# Patient Record
Sex: MALE | Race: WHITE | Employment: FULL TIME | URBAN - METROPOLITAN AREA
[De-identification: names, ages, dates, MRNs, and addresses within clinical notes are randomized per-mention and may not be internally consistent; named-entity substitution may affect disease eponyms.]

---

## 2017-07-24 ENCOUNTER — OFFICE VISIT (OUTPATIENT)
Dept: FAMILY MEDICINE CLINIC | Age: 32
End: 2017-07-24

## 2017-07-24 VITALS
DIASTOLIC BLOOD PRESSURE: 84 MMHG | SYSTOLIC BLOOD PRESSURE: 142 MMHG | HEIGHT: 75 IN | WEIGHT: 272 LBS | BODY MASS INDEX: 33.82 KG/M2

## 2017-07-24 DIAGNOSIS — J45.20 MILD INTERMITTENT ASTHMA WITHOUT COMPLICATION: ICD-10-CM

## 2017-07-24 DIAGNOSIS — E66.09 NON MORBID OBESITY DUE TO EXCESS CALORIES: ICD-10-CM

## 2017-07-24 DIAGNOSIS — R03.0 ELEVATED BLOOD PRESSURE READING WITHOUT DIAGNOSIS OF HYPERTENSION: ICD-10-CM

## 2017-07-24 DIAGNOSIS — E78.00 HYPERCHOLESTEROLEMIA: Primary | ICD-10-CM

## 2017-07-24 PROCEDURE — 99214 OFFICE O/P EST MOD 30 MIN: CPT | Performed by: FAMILY MEDICINE

## 2017-07-24 RX ORDER — ATORVASTATIN CALCIUM 40 MG/1
40 TABLET, FILM COATED ORAL DAILY
Qty: 30 TABLET | Refills: 11 | Status: SHIPPED | OUTPATIENT
Start: 2017-07-24 | End: 2018-10-16

## 2017-12-28 ENCOUNTER — OFFICE VISIT (OUTPATIENT)
Dept: FAMILY MEDICINE CLINIC | Age: 32
End: 2017-12-28

## 2017-12-28 VITALS
BODY MASS INDEX: 34.57 KG/M2 | DIASTOLIC BLOOD PRESSURE: 66 MMHG | SYSTOLIC BLOOD PRESSURE: 102 MMHG | HEART RATE: 145 BPM | RESPIRATION RATE: 18 BRPM | OXYGEN SATURATION: 92 % | HEIGHT: 75 IN | WEIGHT: 278 LBS | TEMPERATURE: 100.2 F

## 2017-12-28 DIAGNOSIS — J45.20 MILD INTERMITTENT ASTHMA WITHOUT COMPLICATION: ICD-10-CM

## 2017-12-28 DIAGNOSIS — E78.00 HYPERCHOLESTEROLEMIA: ICD-10-CM

## 2017-12-28 DIAGNOSIS — R00.0 TACHYCARDIA: Primary | ICD-10-CM

## 2017-12-28 DIAGNOSIS — R68.89 FLU-LIKE SYMPTOMS: ICD-10-CM

## 2017-12-28 PROCEDURE — G8484 FLU IMMUNIZE NO ADMIN: HCPCS | Performed by: INTERNAL MEDICINE

## 2017-12-28 PROCEDURE — G8427 DOCREV CUR MEDS BY ELIG CLIN: HCPCS | Performed by: INTERNAL MEDICINE

## 2017-12-28 PROCEDURE — G8417 CALC BMI ABV UP PARAM F/U: HCPCS | Performed by: INTERNAL MEDICINE

## 2017-12-28 PROCEDURE — 99214 OFFICE O/P EST MOD 30 MIN: CPT | Performed by: INTERNAL MEDICINE

## 2017-12-28 PROCEDURE — 1036F TOBACCO NON-USER: CPT | Performed by: INTERNAL MEDICINE

## 2017-12-28 PROCEDURE — 93000 ELECTROCARDIOGRAM COMPLETE: CPT | Performed by: INTERNAL MEDICINE

## 2017-12-28 NOTE — PROGRESS NOTES
Bleeding childhood         Social History     Social History    Marital status: Single     Spouse name: Laisha Mac    Number of children: 2    Years of education: N/A     Occupational History    [employer]        Social History Main Topics    Smoking status: Never Smoker    Smokeless tobacco: Not on file    Alcohol use 0.0 oz/week      Comment: Rare    Drug use: Unknown    Sexual activity: Not on file     Other Topics Concern    Not on file     Social History Narrative    No narrative on file       Family History   Problem Relation Age of Onset    Heart Attack Mother 39     x 3    Hypertension Mother     High Cholesterol Mother     High Cholesterol Father     Hypertension Father     Heart Attack Father 43           Review of Systems      Objective     /66   Pulse 145   Temp 100.2 °F (37.9 °C)   Resp 18   Ht 6' 3\" (1.905 m)   Wt 278 lb (126.1 kg)   SpO2 92% Comment: RA  BMI 34.75 kg/m²         Wt Readings from Last 3 Encounters:   12/28/17 278 lb (126.1 kg)   07/24/17 272 lb (123.4 kg)   09/01/16 273 lb (123.8 kg)       Physical Exam     NAD alert and cooperative Moderately ill  HEENT: Conjunctival injection. TMs unremarkable. Dry hypopharynx. No icterus. Good upstrokes of the carotids. No adenopathy. Lungs decreased breath sounds. Increased cough with deep inspiration. Questionable rales left base. Cardiovascular exam tachycardic. No murmur or ectopy noted. No hepatosplenomegaly or epigastric tenderness  No cords. Good skin turgor.     EKG sinus tachycardia 140 with abnormal T-wave diffusely      Chemistry        Component Value Date/Time     09/01/2016 0903    K 4.8 09/01/2016 0903    CL 98 (L) 09/01/2016 0903    CO2 29 09/01/2016 0903    BUN 15 09/01/2016 0903    CREATININE 1.0 09/01/2016 0903        Component Value Date/Time    CALCIUM 9.9 09/01/2016 0903    ALKPHOS 52 09/01/2016 0903    AST 31 09/01/2016 0903    ALT 42 (H) 09/01/2016 0903    BILITOT 0.4 09/01/2016 0903            No results found for: WBC, HGB, HCT, MCV, PLT  No results found for: LABA1C  No results found for: EAG  No results found for: LABA1C  No components found for: CHLPL  Lab Results   Component Value Date    TRIG 630 (H) 09/01/2016    TRIG 472 (H) 06/02/2016    TRIG 256 (H) 05/05/2015     Lab Results   Component Value Date    HDL 50 09/01/2016    HDL 53 06/02/2016    HDL 39 (L) 05/05/2015     Lab Results   Component Value Date    LDLCALC see below 09/01/2016    LDLCALC see below 06/02/2016    LDLCALC 160 (H) 05/05/2015     Lab Results   Component Value Date    LABVLDL see below 09/01/2016    LABVLDL see below 06/02/2016    LABVLDL 51 05/05/2015       Old labs and records reviewed or requested  Discussed past lab and studies with patient   1. Tachycardia  EKG 12 Lead   2. Flu-like symptoms     3. Hypercholesterolemia     4. Mild intermittent asthma without complication         Probable influenza with dehydration and tachycardia. History of early MI and parents in her 45s and his change is on EKG with hyperlipidemia make this more concerning. Was given aspirin and in the clinic and EMS called for transport. Anticipate chest x-ray nebulizer rule out myocardial issues and IV fluids      Follow-up next week      Diagnosis and treatment discussed.   Possible side effects of medication reviewed  Patients questions answered  Follow up understood  Pt aware if they are not contacted about any test results , this does not mean they are normal.  They should call

## 2018-10-11 LAB
BUN BLDV-MCNC: 16 MG/DL
CALCIUM SERPL-MCNC: 4.9 MG/DL
CHLORIDE BLD-SCNC: 104 MMOL/L
CO2: 26 MMOL/L
CREAT SERPL-MCNC: 0.9 MG/DL
GFR CALCULATED: 85
GLUCOSE BLD-MCNC: 100 MG/DL
POTASSIUM SERPL-SCNC: 3.9 MMOL/L
SODIUM BLD-SCNC: 140 MMOL/L

## 2018-10-16 PROBLEM — E78.2 MIXED HYPERLIPIDEMIA: Status: ACTIVE | Noted: 2018-10-11

## 2018-10-16 PROBLEM — I25.10 CAD, MULTIPLE VESSEL: Status: ACTIVE | Noted: 2018-10-16

## 2018-10-16 PROBLEM — I21.4 NSTEMI (NON-ST ELEVATED MYOCARDIAL INFARCTION) (HCC): Status: ACTIVE | Noted: 2018-10-16

## 2018-10-16 PROBLEM — I10 ESSENTIAL HYPERTENSION: Status: ACTIVE | Noted: 2018-10-16

## 2018-10-16 RX ORDER — CARVEDILOL 6.25 MG/1
6.25 TABLET ORAL
COMMUNITY
Start: 2018-10-12 | End: 2020-09-24 | Stop reason: SDUPTHER

## 2018-10-16 RX ORDER — CLOPIDOGREL BISULFATE 75 MG/1
75 TABLET ORAL
COMMUNITY
Start: 2018-10-13 | End: 2019-11-11 | Stop reason: ALTCHOICE

## 2018-10-16 RX ORDER — ATORVASTATIN CALCIUM 80 MG/1
80 TABLET, FILM COATED ORAL
COMMUNITY
Start: 2018-10-12 | End: 2021-03-31 | Stop reason: SDUPTHER

## 2018-10-16 RX ORDER — LOSARTAN POTASSIUM 25 MG/1
25 TABLET ORAL
COMMUNITY
Start: 2018-10-12 | End: 2021-03-31 | Stop reason: SDUPTHER

## 2018-10-16 RX ORDER — ASPIRIN 325 MG
325 TABLET ORAL
COMMUNITY
Start: 2018-10-13 | End: 2019-02-28 | Stop reason: CLARIF

## 2018-10-17 ENCOUNTER — OFFICE VISIT (OUTPATIENT)
Dept: FAMILY MEDICINE CLINIC | Age: 33
End: 2018-10-17
Payer: COMMERCIAL

## 2018-10-17 VITALS
TEMPERATURE: 95 F | BODY MASS INDEX: 34.07 KG/M2 | HEART RATE: 97 BPM | HEIGHT: 75 IN | SYSTOLIC BLOOD PRESSURE: 123 MMHG | DIASTOLIC BLOOD PRESSURE: 83 MMHG | WEIGHT: 274 LBS

## 2018-10-17 DIAGNOSIS — I21.4 NSTEMI (NON-ST ELEVATED MYOCARDIAL INFARCTION) (HCC): Primary | ICD-10-CM

## 2018-10-17 DIAGNOSIS — E66.09 NON MORBID OBESITY DUE TO EXCESS CALORIES: ICD-10-CM

## 2018-10-17 DIAGNOSIS — E78.2 MIXED HYPERLIPIDEMIA: ICD-10-CM

## 2018-10-17 DIAGNOSIS — I10 ESSENTIAL HYPERTENSION: ICD-10-CM

## 2018-10-17 DIAGNOSIS — I25.10 CAD, MULTIPLE VESSEL: ICD-10-CM

## 2018-10-17 DIAGNOSIS — J30.1 SEASONAL ALLERGIC RHINITIS DUE TO POLLEN: ICD-10-CM

## 2018-10-17 PROCEDURE — G8484 FLU IMMUNIZE NO ADMIN: HCPCS | Performed by: FAMILY MEDICINE

## 2018-10-17 PROCEDURE — 1111F DSCHRG MED/CURRENT MED MERGE: CPT | Performed by: FAMILY MEDICINE

## 2018-10-17 PROCEDURE — 99214 OFFICE O/P EST MOD 30 MIN: CPT | Performed by: FAMILY MEDICINE

## 2018-10-17 PROCEDURE — G8427 DOCREV CUR MEDS BY ELIG CLIN: HCPCS | Performed by: FAMILY MEDICINE

## 2018-10-17 PROCEDURE — G8598 ASA/ANTIPLAT THER USED: HCPCS | Performed by: FAMILY MEDICINE

## 2018-10-17 PROCEDURE — G8417 CALC BMI ABV UP PARAM F/U: HCPCS | Performed by: FAMILY MEDICINE

## 2018-10-17 PROCEDURE — 1036F TOBACCO NON-USER: CPT | Performed by: FAMILY MEDICINE

## 2018-10-17 RX ORDER — FLUTICASONE PROPIONATE 50 MCG
1 SPRAY, SUSPENSION (ML) NASAL DAILY
Qty: 1 BOTTLE | Refills: 3 | Status: SHIPPED | OUTPATIENT
Start: 2018-10-17 | End: 2020-07-28

## 2018-10-17 RX ORDER — AZELASTINE 1 MG/ML
2 SPRAY, METERED NASAL 2 TIMES DAILY
Qty: 1 BOTTLE | Refills: 3 | Status: SHIPPED | OUTPATIENT
Start: 2018-10-17 | End: 2020-07-28

## 2018-10-17 RX ORDER — LISINOPRIL 5 MG/1
5 TABLET ORAL DAILY
COMMUNITY
End: 2018-10-17

## 2018-10-17 ASSESSMENT — PATIENT HEALTH QUESTIONNAIRE - PHQ9
SUM OF ALL RESPONSES TO PHQ9 QUESTIONS 1 & 2: 0
SUM OF ALL RESPONSES TO PHQ QUESTIONS 1-9: 0
SUM OF ALL RESPONSES TO PHQ QUESTIONS 1-9: 0
2. FEELING DOWN, DEPRESSED OR HOPELESS: 0
1. LITTLE INTEREST OR PLEASURE IN DOING THINGS: 0

## 2018-10-17 ASSESSMENT — ENCOUNTER SYMPTOMS
RHINORRHEA: 0
COUGH: 1
SHORTNESS OF BREATH: 0
DIARRHEA: 0
CONSTIPATION: 0
NAUSEA: 0
SORE THROAT: 0
EYE PAIN: 0
COLOR CHANGE: 0
VOMITING: 0
ABDOMINAL PAIN: 0

## 2018-10-17 NOTE — PATIENT INSTRUCTIONS
Wt Readings from Last 3 Encounters:   10/17/18 274 lb (124.3 kg)   12/28/17 278 lb (126.1 kg)   12/28/17 278 lb (126.1 kg)       BP Readings from Last 3 Encounters:   10/17/18 123/83   12/28/17 110/88   12/28/17 102/66       Here are some recommendations for weight loss:  Check into The GI Diet or \"Primal diet\"    Take your desired weight in pounds and multiply by 10 and that is your average daily calorie allowance. For example if you wish to weigh 170 lb x 10 = 1700 era/day (this is how to gradually lose the weight and maintain your desired weight). Avoid soda/coke and all \"wet carbs\" => Drink ice water instead    Drink a large glass of ice water before meals and EAT SLOWLY (talk while you eat)! Rethink your hunger => it means your losing weight. Minimize carbohydrates as they stimulate your appetite.   Avoid Bread, Rice, Pasta and Potatoes      Avoid eating calories after 6 pm    Consider getting [employer] alexandre for your phone  Consider getting a FitBit to track your activity

## 2018-10-17 NOTE — PROGRESS NOTES
Chief Complaint   Patient presents with    Follow-Up from Hospital           HPI:  Bj Bhat is a 35 y.o. (: 1985) here today   for a follow up from his recent hospitalization at [facility] on 10/11/18 and discharged 10/13/18 from a myocardial infarction. He had noted exertional chest pain (with walking) the morning he went into the ER. He was taken to the cath lab and had 4 stents placed and has been discharged on statins, hypertension meds, and plavix. He says that he will be following Dr. Dorita Walker who practices at [facility]. He has an appt with CNP at his office on the  of this month to start his after care. He says has instructions to follow up with cardiac rehab. Review of Systems   Constitutional: Negative for chills and fever. HENT: Positive for congestion. Negative for ear pain, rhinorrhea and sore throat. Eyes: Negative for pain and visual disturbance. Respiratory: Positive for cough. Negative for shortness of breath. Cardiovascular: Negative for chest pain and palpitations. Gastrointestinal: Negative for abdominal pain, constipation, diarrhea, nausea and vomiting. Genitourinary: Negative for dysuria and frequency. Musculoskeletal: Negative for joint swelling and myalgias. Skin: Negative for color change and rash. Neurological: Negative for weakness, numbness and headaches. Hematological: Negative for adenopathy. Does not bruise/bleed easily. Psychiatric/Behavioral: Negative for dysphoric mood, self-injury and suicidal ideas. The patient is not nervous/anxious. Allergies   Allergen Reactions    Zocor [Simvastatin]      New Prescriptions    No medications on file       Meds Prior to visit:  No current outpatient prescriptions on file prior to visit. No current facility-administered medications on file prior to visit.         Past Medical History:   Diagnosis Date    Allergic rhinitis     Asthma     Hypercholesterolemia     Urticaria      Past Surgical route 2 times daily Use in each nostril as directed  Dispense: 1 Bottle; Refill: 3      Marii Parra received counseling on the following healthy behaviors: nutrition and exercise    Patient given educational materials on Nutrition    Discussed use, benefit, and side effects of prescribed medications. Barriers to medication compliance addressed. All patient questions answered. Pt voiced understanding. RTC 6 weeks. Scribe attestation:  Sera Alicea MA, am scribing for and in the presence of Chaz Valadez MD. Electronically signed by Peggy Sheffield MA on 10/17/2018 at 3:16 PM          Provider attestation: Hector Mckeon MD, personally performed the services scribed by the user listed above in my presence, and it is both accurate and complete. I agree with the ROS and Past Histories independently gathered by the clinical support staff and the remaining scribed note accurately describes my personal service to the patient.     [facility]    10/17/2018  3:34 PM

## 2018-10-18 LAB — TROPONIN: 0.09

## 2018-11-28 ENCOUNTER — OFFICE VISIT (OUTPATIENT)
Dept: FAMILY MEDICINE CLINIC | Age: 33
End: 2018-11-28
Payer: COMMERCIAL

## 2018-11-28 VITALS
BODY MASS INDEX: 32.83 KG/M2 | WEIGHT: 264 LBS | HEIGHT: 75 IN | SYSTOLIC BLOOD PRESSURE: 112 MMHG | DIASTOLIC BLOOD PRESSURE: 80 MMHG

## 2018-11-28 DIAGNOSIS — I10 ESSENTIAL HYPERTENSION: ICD-10-CM

## 2018-11-28 DIAGNOSIS — I25.10 CAD, MULTIPLE VESSEL: Primary | ICD-10-CM

## 2018-11-28 DIAGNOSIS — E78.00 HYPERCHOLESTEROLEMIA: ICD-10-CM

## 2018-11-28 DIAGNOSIS — E66.09 NON MORBID OBESITY DUE TO EXCESS CALORIES: ICD-10-CM

## 2018-11-28 DIAGNOSIS — Z23 NEED FOR VACCINATION: ICD-10-CM

## 2018-11-28 PROCEDURE — G8484 FLU IMMUNIZE NO ADMIN: HCPCS | Performed by: FAMILY MEDICINE

## 2018-11-28 PROCEDURE — G8427 DOCREV CUR MEDS BY ELIG CLIN: HCPCS | Performed by: FAMILY MEDICINE

## 2018-11-28 PROCEDURE — G8598 ASA/ANTIPLAT THER USED: HCPCS | Performed by: FAMILY MEDICINE

## 2018-11-28 PROCEDURE — G8417 CALC BMI ABV UP PARAM F/U: HCPCS | Performed by: FAMILY MEDICINE

## 2018-11-28 PROCEDURE — 99214 OFFICE O/P EST MOD 30 MIN: CPT | Performed by: FAMILY MEDICINE

## 2018-11-28 PROCEDURE — 1036F TOBACCO NON-USER: CPT | Performed by: FAMILY MEDICINE

## 2018-11-28 RX ORDER — ICOSAPENT ETHYL 1000 MG/1
2 CAPSULE ORAL 2 TIMES DAILY
Qty: 120 CAPSULE | Refills: 5 | Status: SHIPPED | OUTPATIENT
Start: 2018-11-28 | End: 2019-02-28

## 2018-11-28 RX ORDER — EZETIMIBE 10 MG/1
10 TABLET ORAL
COMMUNITY
Start: 2018-11-20 | End: 2021-03-31 | Stop reason: SDUPTHER

## 2018-11-28 ASSESSMENT — ENCOUNTER SYMPTOMS
CONSTIPATION: 0
SHORTNESS OF BREATH: 0
VOMITING: 0
ABDOMINAL PAIN: 0
COUGH: 0
NAUSEA: 0
DIARRHEA: 0

## 2018-11-30 ENCOUNTER — TELEPHONE (OUTPATIENT)
Dept: FAMILY MEDICINE CLINIC | Age: 33
End: 2018-11-30

## 2019-02-14 ENCOUNTER — TELEPHONE (OUTPATIENT)
Dept: PRIMARY CARE CLINIC | Age: 34
End: 2019-02-14

## 2019-02-28 ENCOUNTER — OFFICE VISIT (OUTPATIENT)
Dept: PRIMARY CARE CLINIC | Age: 34
End: 2019-02-28
Payer: COMMERCIAL

## 2019-02-28 VITALS
SYSTOLIC BLOOD PRESSURE: 123 MMHG | WEIGHT: 264 LBS | HEART RATE: 88 BPM | BODY MASS INDEX: 32.83 KG/M2 | DIASTOLIC BLOOD PRESSURE: 77 MMHG | HEIGHT: 75 IN

## 2019-02-28 DIAGNOSIS — E66.09 NON MORBID OBESITY DUE TO EXCESS CALORIES: ICD-10-CM

## 2019-02-28 DIAGNOSIS — J45.20 MILD INTERMITTENT ASTHMA WITHOUT COMPLICATION: ICD-10-CM

## 2019-02-28 DIAGNOSIS — I10 ESSENTIAL HYPERTENSION: ICD-10-CM

## 2019-02-28 DIAGNOSIS — E78.2 MIXED HYPERLIPIDEMIA: ICD-10-CM

## 2019-02-28 DIAGNOSIS — I25.10 CAD, MULTIPLE VESSEL: Primary | ICD-10-CM

## 2019-02-28 DIAGNOSIS — E78.00 HYPERCHOLESTEROLEMIA: ICD-10-CM

## 2019-02-28 PROCEDURE — G8598 ASA/ANTIPLAT THER USED: HCPCS | Performed by: FAMILY MEDICINE

## 2019-02-28 PROCEDURE — G8417 CALC BMI ABV UP PARAM F/U: HCPCS | Performed by: FAMILY MEDICINE

## 2019-02-28 PROCEDURE — 99215 OFFICE O/P EST HI 40 MIN: CPT | Performed by: FAMILY MEDICINE

## 2019-02-28 PROCEDURE — G8427 DOCREV CUR MEDS BY ELIG CLIN: HCPCS | Performed by: FAMILY MEDICINE

## 2019-02-28 PROCEDURE — 1036F TOBACCO NON-USER: CPT | Performed by: FAMILY MEDICINE

## 2019-02-28 PROCEDURE — G8484 FLU IMMUNIZE NO ADMIN: HCPCS | Performed by: FAMILY MEDICINE

## 2019-02-28 RX ORDER — ICOSAPENT ETHYL 1000 MG/1
2 CAPSULE ORAL 2 TIMES DAILY
Qty: 120 CAPSULE | Refills: 5 | Status: SHIPPED | OUTPATIENT
Start: 2019-02-28 | End: 2020-07-21 | Stop reason: SDUPTHER

## 2019-02-28 RX ORDER — ASPIRIN 81 MG/1
TABLET, COATED ORAL
Refills: 10 | COMMUNITY
Start: 2019-01-02 | End: 2022-03-03 | Stop reason: SDUPTHER

## 2019-02-28 ASSESSMENT — ENCOUNTER SYMPTOMS
NAUSEA: 0
RHINORRHEA: 0
VOMITING: 0
EYE PAIN: 0
CONSTIPATION: 0
ABDOMINAL PAIN: 0
COLOR CHANGE: 0
COUGH: 0
SHORTNESS OF BREATH: 0
DIARRHEA: 0
SORE THROAT: 0

## 2019-02-28 ASSESSMENT — PATIENT HEALTH QUESTIONNAIRE - PHQ9
SUM OF ALL RESPONSES TO PHQ QUESTIONS 1-9: 0
1. LITTLE INTEREST OR PLEASURE IN DOING THINGS: 0
SUM OF ALL RESPONSES TO PHQ9 QUESTIONS 1 & 2: 0
2. FEELING DOWN, DEPRESSED OR HOPELESS: 0
SUM OF ALL RESPONSES TO PHQ QUESTIONS 1-9: 0

## 2019-05-16 ENCOUNTER — TELEPHONE (OUTPATIENT)
Dept: PRIMARY CARE CLINIC | Age: 34
End: 2019-05-16

## 2019-05-16 NOTE — TELEPHONE ENCOUNTER
Attempted to contact patient on 5/16/2019. Result: left message on the patient's voicemail asking patient to return my call. Pre-Visit planning not completed.            Humberto Kang  Patient Services Specialist  [phone]

## 2019-05-22 NOTE — TELEPHONE ENCOUNTER
Attempted to contact patient on 5/22/2019. Result: left message on the patient's voicemail asking patient to return my call. Pre-Visit planning not completed.            Parish Crawford  Patient Services Specialist  [phone]

## 2019-05-30 ENCOUNTER — OFFICE VISIT (OUTPATIENT)
Dept: PRIMARY CARE CLINIC | Age: 34
End: 2019-05-30
Payer: COMMERCIAL

## 2019-05-30 VITALS
HEART RATE: 89 BPM | DIASTOLIC BLOOD PRESSURE: 84 MMHG | RESPIRATION RATE: 16 BRPM | OXYGEN SATURATION: 96 % | TEMPERATURE: 97.9 F | WEIGHT: 273 LBS | SYSTOLIC BLOOD PRESSURE: 135 MMHG | BODY MASS INDEX: 33.94 KG/M2 | HEIGHT: 75 IN

## 2019-05-30 DIAGNOSIS — I25.10 CAD, MULTIPLE VESSEL: ICD-10-CM

## 2019-05-30 DIAGNOSIS — B35.3 TINEA PEDIS OF LEFT FOOT: ICD-10-CM

## 2019-05-30 DIAGNOSIS — E78.2 MIXED HYPERLIPIDEMIA: Primary | ICD-10-CM

## 2019-05-30 DIAGNOSIS — J45.20 MILD INTERMITTENT ASTHMA WITHOUT COMPLICATION: ICD-10-CM

## 2019-05-30 DIAGNOSIS — I10 ESSENTIAL HYPERTENSION: ICD-10-CM

## 2019-05-30 DIAGNOSIS — E66.09 NON MORBID OBESITY DUE TO EXCESS CALORIES: ICD-10-CM

## 2019-05-30 DIAGNOSIS — E78.2 MIXED HYPERLIPIDEMIA: ICD-10-CM

## 2019-05-30 PROCEDURE — 99215 OFFICE O/P EST HI 40 MIN: CPT | Performed by: FAMILY MEDICINE

## 2019-05-30 PROCEDURE — G8427 DOCREV CUR MEDS BY ELIG CLIN: HCPCS | Performed by: FAMILY MEDICINE

## 2019-05-30 PROCEDURE — 1036F TOBACCO NON-USER: CPT | Performed by: FAMILY MEDICINE

## 2019-05-30 PROCEDURE — G8598 ASA/ANTIPLAT THER USED: HCPCS | Performed by: FAMILY MEDICINE

## 2019-05-30 PROCEDURE — G8417 CALC BMI ABV UP PARAM F/U: HCPCS | Performed by: FAMILY MEDICINE

## 2019-05-30 RX ORDER — PRENATAL VIT 91/IRON/FOLIC/DHA 28-975-200
COMBINATION PACKAGE (EA) ORAL
Qty: 30 G | Refills: 1 | Status: SHIPPED | OUTPATIENT
Start: 2019-05-30 | End: 2019-08-02 | Stop reason: ALTCHOICE

## 2019-05-30 ASSESSMENT — ENCOUNTER SYMPTOMS
VOMITING: 0
COLOR CHANGE: 0
CONSTIPATION: 0
DIARRHEA: 0
EYE PAIN: 0
NAUSEA: 0
ABDOMINAL PAIN: 0
SORE THROAT: 0
SHORTNESS OF BREATH: 0
COUGH: 0
RHINORRHEA: 0

## 2019-05-30 NOTE — PROGRESS NOTES
Chief Complaint   Patient presents with    Hypertension    Coronary Artery Disease    Asthma    Hyperlipidemia    Obesity     Non morbid obesity due to excess calories           HPI:  Maribel Pappas is a 29 y.o. (: 1985) here today for multiple medical problems. He is compliant with his blood pressure medications  without Lightheadedness, Urinary Frequency, Edema and Sedation  He is not checking Home Blood Pressures          He is compliant with his coronary artery disease medications  without Lightheadedness, Urinary Frequency, Edema and Sedation  He does not have dyspnea when: With Heavy Manual labor or Climbing hills  He does not have chest pain when: With Heavy Manual labor or Climbing hills      He is not  taking medications as instructed, no medication side effects noted, no significant ongoing wheezing or shortness of breath, using bronchodilator MDI less than twice a week. He is not having acute dyspnea and chronic dyspnea   He says that he has not used any inhalers as they are to expensive. He is compliant with his cholesterol medication without myalgia and abdominal pain  He is not taking the Vascepa though, he says that it was almost $280.00. Patient cites health, increased physical ability as reasons for wanting to lose weight. Thinks they have trouble with weight due to: food choices, calorie intake, eating after 6pm, and lack of physical activity. He took on additional responsibilityes at work. Weight loss techniques attempted: self-directed dieting and very low calorie diet  Comorbidities: coronary heart disease, dyslipidemias and hypertension      He says that he has some cracked dry skin that has been on going between both his feet but right now it is his left foot. He says that the skin cracked open. Review of Systems   Constitutional: Negative for chills and fever. HENT: Negative for ear pain, rhinorrhea and sore throat.     Eyes: Negative for pain and current facility-administered medications on file prior to visit. Past Medical History:   Diagnosis Date    Allergic rhinitis     Asthma     CAD (coronary artery disease) 10/11/2018    DEDE x 4 (DAPT x 1 year) Dr. Penelope Arthur    Hypercholesterolemia     Urticaria      Past Surgical History:   Procedure Laterality Date    CARDIAC CATHETERIZATION  10/11/2018    4 Stents placed- Dr Marshall Rios      Bleeding childhood     Family History   Problem Relation Age of Onset    Heart Attack Mother 39        x 3    Hypertension Mother     High Cholesterol Mother     High Cholesterol Father     Hypertension Father     Heart Attack Father 43    Heart Attack Maternal Grandmother         Heart attack     Social History     Tobacco Use    Smoking status: Never Smoker    Smokeless tobacco: Never Used   Substance Use Topics    Alcohol use: Yes     Alcohol/week: 0.0 oz     Comment: Rare    Drug use: No       Objective   /84 (Site: Left Upper Arm, Position: Sitting, Cuff Size: Large Adult)   Pulse 89   Temp 97.9 °F (36.6 °C) (Oral)   Resp 16   Ht 6' 3\" (1.905 m)   Wt 273 lb (123.8 kg)   SpO2 96%   BMI 34.12 kg/m²   Wt Readings from Last 3 Encounters:   05/30/19 273 lb (123.8 kg)   02/28/19 264 lb (119.7 kg)   11/28/18 264 lb (119.7 kg)       Physical Exam   Constitutional: He appears well-developed and well-nourished. HENT:   Head: Normocephalic and atraumatic. Nose: Nose normal.   Mouth/Throat: No oropharyngeal exudate. TMs negative bilaterally, canals patent, nasal mucosa pink and patent,  Oropharynx pink and patent   Eyes: Pupils are equal, round, and reactive to light. Right eye exhibits no discharge. Left eye exhibits no discharge. No scleral icterus. Neck: Normal range of motion. Neck supple. No thyromegaly present. Cardiovascular: Normal rate, regular rhythm, normal heart sounds and intact distal pulses. Exam reveals no gallop and no friction rub. No murmur heard.   Pulses: Dorsalis pedis pulses are 2+ on the right side, and 2+ on the left side. Posterior tibial pulses are 2+ on the right side, and 2+ on the left side. No Edema Lower Extremities   Pulmonary/Chest: Effort normal and breath sounds normal. He has no wheezes. He has no rales. Abdominal: Soft. Bowel sounds are normal. He exhibits no distension. There is no splenomegaly or hepatomegaly. There is no tenderness. There is no rebound and no guarding. Musculoskeletal: Normal range of motion. He exhibits no tenderness or deformity. Lymphadenopathy:     He has no cervical adenopathy. Neurological: He is alert. He has normal strength. No cranial nerve deficit or sensory deficit. Gait normal.   Skin: Skin is warm and dry. Rash noted. No erythema. Fissured and flaky rash on L foot sole. Psychiatric: He has a normal mood and affect.  His speech is normal and behavior is normal.         Chemistry        Component Value Date/Time     10/11/2018    K 3.9 10/11/2018     10/11/2018    CO2 26 10/11/2018    BUN 16 10/11/2018    CREATININE 0.9 10/11/2018        Component Value Date/Time    CALCIUM 4.9 10/11/2018    ALKPHOS 52 09/01/2016 0903    AST 31 09/01/2016 0903    ALT 42 (H) 09/01/2016 0903    BILITOT 0.4 09/01/2016 0903          Lab Results   Component Value Date    WBC 9.3 12/28/2017    HGB 15.7 12/28/2017    HCT 46.2 12/28/2017    MCV 87.5 12/28/2017     12/28/2017     No results found for: LABA1C  No results found for: EAG  No results found for: LABA1C  No components found for: CHLPL  Lab Results   Component Value Date    TRIG 630 (H) 09/01/2016    TRIG 472 (H) 06/02/2016    TRIG 256 (H) 05/05/2015     Lab Results   Component Value Date    HDL 50 09/01/2016    HDL 53 06/02/2016    HDL 39 (L) 05/05/2015     Lab Results   Component Value Date    LDLCALC see below 09/01/2016    LDLCALC see below 06/02/2016    LDLCALC 160 (H) 05/05/2015     Lab Results   Component Value Date    LABVLDL see below accurate and complete. I agree with the ROS and Past Histories independently gathered by the clinical support staff and the remaining scribed note accurately describes my personal service to the patient.     [facility]    5/30/2019  9:18 AM

## 2019-05-30 NOTE — PATIENT INSTRUCTIONS
Wt Readings from Last 3 Encounters:   05/30/19 273 lb (123.8 kg)   02/28/19 264 lb (119.7 kg)   11/28/18 264 lb (119.7 kg)       BP Readings from Last 3 Encounters:   05/30/19 135/84   02/28/19 123/77   11/28/18 112/80       Here are some recommendations for weight loss:  Check into The GI Diet or \"Primal diet\"    Take your desired weight in pounds and multiply by 10 and that is your average daily calorie allowance. For example if you wish to weigh 170 lb x 10 = 1700 era/day (this is how to gradually lose the weight and maintain your desired weight). Avoid soda/coke and all \"wet carbs\" => Drink ice water instead    Drink a large glass of ice water before meals and EAT SLOWLY (talk while you eat)! Rethink your hunger => it means your losing weight. Minimize carbohydrates as they stimulate your appetite. Avoid Bread, Rice, Pasta and Potatoes      Avoid eating calories after 6 pm    Consider getting [employer] alexandre for your phone  Consider getting a FitBit to track your activity      Use triamcinolone until fissures heal - then use drysol  Use terbinfine for at least 2 weeks then use spray antifungal powder in shoes/socks.

## 2019-05-31 LAB
A/G RATIO: 2.1 (ref 1.1–2.2)
ALBUMIN SERPL-MCNC: 4.9 G/DL (ref 3.4–5)
ALP BLD-CCNC: 52 U/L (ref 40–129)
ALT SERPL-CCNC: 26 U/L (ref 10–40)
ANION GAP SERPL CALCULATED.3IONS-SCNC: 17 MMOL/L (ref 3–16)
AST SERPL-CCNC: 23 U/L (ref 15–37)
BILIRUB SERPL-MCNC: 0.5 MG/DL (ref 0–1)
BUN BLDV-MCNC: 14 MG/DL (ref 7–20)
CALCIUM SERPL-MCNC: 9.7 MG/DL (ref 8.3–10.6)
CHLORIDE BLD-SCNC: 102 MMOL/L (ref 99–110)
CHOLESTEROL, TOTAL: 199 MG/DL (ref 0–199)
CO2: 24 MMOL/L (ref 21–32)
CREAT SERPL-MCNC: 0.9 MG/DL (ref 0.9–1.3)
GFR AFRICAN AMERICAN: >60
GFR NON-AFRICAN AMERICAN: >60
GLOBULIN: 2.3 G/DL
GLUCOSE BLD-MCNC: 102 MG/DL (ref 70–99)
HDLC SERPL-MCNC: 53 MG/DL (ref 40–60)
LDL CHOLESTEROL CALCULATED: 86 MG/DL
POTASSIUM SERPL-SCNC: 4.3 MMOL/L (ref 3.5–5.1)
SODIUM BLD-SCNC: 143 MMOL/L (ref 136–145)
TOTAL PROTEIN: 7.2 G/DL (ref 6.4–8.2)
TRIGL SERPL-MCNC: 299 MG/DL (ref 0–150)
VLDLC SERPL CALC-MCNC: 60 MG/DL

## 2019-06-03 ENCOUNTER — TELEPHONE (OUTPATIENT)
Dept: PRIMARY CARE CLINIC | Age: 34
End: 2019-06-03

## 2019-06-03 NOTE — TELEPHONE ENCOUNTER
Called & [facility] for pt to call the office for a msg from [facility] for him

## 2019-06-03 NOTE — TELEPHONE ENCOUNTER
Note from pharmacy reads:    Drysol 20% is on back order until further notice-production issues. Can Dr Sara Davis prescribe something else?

## 2019-06-03 NOTE — TELEPHONE ENCOUNTER
Please call: He could use a spray on topical anti-perspirant that he can purchase over-the-counter.  Tell him he wants no fragrance in it    The active ingredient is aluminum chloride

## 2019-06-05 ENCOUNTER — TELEPHONE (OUTPATIENT)
Dept: PRIMARY CARE CLINIC | Age: 34
End: 2019-06-05

## 2019-06-05 NOTE — TELEPHONE ENCOUNTER
Got msg from pharmacy that can dispense something else new encounter started for Rx to be prescribed

## 2019-08-02 ENCOUNTER — OFFICE VISIT (OUTPATIENT)
Dept: PRIMARY CARE CLINIC | Age: 34
End: 2019-08-02
Payer: COMMERCIAL

## 2019-08-02 VITALS
SYSTOLIC BLOOD PRESSURE: 135 MMHG | BODY MASS INDEX: 34.44 KG/M2 | WEIGHT: 277 LBS | DIASTOLIC BLOOD PRESSURE: 77 MMHG | HEIGHT: 75 IN | HEART RATE: 85 BPM

## 2019-08-02 DIAGNOSIS — F32.1 CURRENT MODERATE EPISODE OF MAJOR DEPRESSIVE DISORDER WITHOUT PRIOR EPISODE (HCC): Primary | ICD-10-CM

## 2019-08-02 DIAGNOSIS — M77.02 MEDIAL EPICONDYLITIS, LEFT: ICD-10-CM

## 2019-08-02 PROCEDURE — G8417 CALC BMI ABV UP PARAM F/U: HCPCS | Performed by: FAMILY MEDICINE

## 2019-08-02 PROCEDURE — 1036F TOBACCO NON-USER: CPT | Performed by: FAMILY MEDICINE

## 2019-08-02 PROCEDURE — G8598 ASA/ANTIPLAT THER USED: HCPCS | Performed by: FAMILY MEDICINE

## 2019-08-02 PROCEDURE — G8427 DOCREV CUR MEDS BY ELIG CLIN: HCPCS | Performed by: FAMILY MEDICINE

## 2019-08-02 PROCEDURE — G0444 DEPRESSION SCREEN ANNUAL: HCPCS | Performed by: FAMILY MEDICINE

## 2019-08-02 PROCEDURE — 99214 OFFICE O/P EST MOD 30 MIN: CPT | Performed by: FAMILY MEDICINE

## 2019-08-02 RX ORDER — VENLAFAXINE HYDROCHLORIDE 75 MG/1
75 CAPSULE, EXTENDED RELEASE ORAL DAILY
Qty: 90 CAPSULE | Refills: 1 | Status: SHIPPED | OUTPATIENT
Start: 2019-08-02 | End: 2019-10-03

## 2019-08-02 ASSESSMENT — PATIENT HEALTH QUESTIONNAIRE - PHQ9
SUM OF ALL RESPONSES TO PHQ QUESTIONS 1-9: 15
9. THOUGHTS THAT YOU WOULD BE BETTER OFF DEAD, OR OF HURTING YOURSELF: 0
5. POOR APPETITE OR OVEREATING: 1
6. FEELING BAD ABOUT YOURSELF - OR THAT YOU ARE A FAILURE OR HAVE LET YOURSELF OR YOUR FAMILY DOWN: 0
7. TROUBLE CONCENTRATING ON THINGS, SUCH AS READING THE NEWSPAPER OR WATCHING TELEVISION: 3
2. FEELING DOWN, DEPRESSED OR HOPELESS: 1
3. TROUBLE FALLING OR STAYING ASLEEP: 3
SUM OF ALL RESPONSES TO PHQ9 QUESTIONS 1 & 2: 2
1. LITTLE INTEREST OR PLEASURE IN DOING THINGS: 1
10. IF YOU CHECKED OFF ANY PROBLEMS, HOW DIFFICULT HAVE THESE PROBLEMS MADE IT FOR YOU TO DO YOUR WORK, TAKE CARE OF THINGS AT HOME, OR GET ALONG WITH OTHER PEOPLE: 3
8. MOVING OR SPEAKING SO SLOWLY THAT OTHER PEOPLE COULD HAVE NOTICED. OR THE OPPOSITE, BEING SO FIGETY OR RESTLESS THAT YOU HAVE BEEN MOVING AROUND A LOT MORE THAN USUAL: 3
4. FEELING TIRED OR HAVING LITTLE ENERGY: 3
SUM OF ALL RESPONSES TO PHQ QUESTIONS 1-9: 15

## 2019-08-02 ASSESSMENT — ENCOUNTER SYMPTOMS
VOMITING: 0
CONSTIPATION: 0
COUGH: 0
SHORTNESS OF BREATH: 0
DIARRHEA: 0
NAUSEA: 0
ABDOMINAL PAIN: 0

## 2019-08-02 NOTE — PROGRESS NOTES
Chief Complaint   Patient presents with    Depression     new position at work, feeling unfocused and overwhelmed, has had a panic attack    Elbow Pain         HPI:  Jimbo Nieto is a 29 y.o. (:1985) here today for Depression. He started his new position as  3 months ago. He states his symptoms started about two weeks ago. Currently he has depressed mood, difficulty concentrating, fatigue, feelings of worthlessness/guilt, insomnia and weight gain. These are affecting his ability to function at work. He states at times he doesn't want to go to work. He states he has elbow pain. He states this has been going on for about a week. He states the elbow hurts when he wakes up, and \"feels tight but will eventually work itself out\". He denies any injury or excessive overuse. He has not tried any treatment. Review of Systems   Constitutional: Negative for chills and fever. Respiratory: Negative for cough and shortness of breath. Cardiovascular: Negative for chest pain and palpitations. Gastrointestinal: Negative for abdominal pain, constipation, diarrhea, nausea and vomiting. Endocrine: Negative for polyuria. Genitourinary: Negative for dysuria. Musculoskeletal: Positive for joint swelling. Psychiatric/Behavioral: Positive for decreased concentration and sleep disturbance. Negative for agitation. The patient is nervous/anxious.         Past Medical History:   Diagnosis Date    Allergic rhinitis     Asthma     CAD (coronary artery disease) 10/11/2018    DEDE x 4 (DAPT x 1 year) Dr. Jin Hernandez    Hypercholesterolemia     Urticaria      Family History   Problem Relation Age of Onset    Heart Attack Mother 39        x 3    Hypertension Mother     High Cholesterol Mother     High Cholesterol Father     Hypertension Father     Heart Attack Father 43    Heart Attack Maternal Grandmother         Heart attack     Social History     Socioeconomic History    Marital spray 1 spray by Nasal route daily Yes Ever Cardona MD   azelastine (ASTELIN) 0.1 % nasal spray 2 sprays by Nasal route 2 times daily Use in each nostril as directed Yes Ever Cardona MD   atorvastatin (LIPITOR) 80 MG tablet Take 80 mg by mouth Yes Historical Provider, MD   carvedilol (COREG) 6.25 MG tablet Take 6.25 mg by mouth Take 1 and a half tablets a day Yes Historical Provider, MD   clopidogrel (PLAVIX) 75 MG tablet Take 75 mg by mouth Yes Historical Provider, MD   losartan (COZAAR) 25 MG tablet Take 25 mg by mouth Yes Historical Provider, MD     Allergies   Allergen Reactions    Zocor [Simvastatin]        OBJECTIVE:    /77   Pulse 85   Ht 6' 3\" (1.905 m)   Wt 277 lb (125.6 kg)   BMI 34.62 kg/m²   BP Readings from Last 2 Encounters:   08/02/19 135/77   05/30/19 135/84     Wt Readings from Last 3 Encounters:   08/02/19 277 lb (125.6 kg)   05/30/19 273 lb (123.8 kg)   02/28/19 264 lb (119.7 kg)       Physical Exam   Constitutional: He appears well-developed and well-nourished. Cardiovascular: Normal rate and regular rhythm. Exam reveals no gallop and no friction rub. No murmur heard. Pulmonary/Chest: Effort normal and breath sounds normal. He has no wheezes. He has no rales. Abdominal: Soft. Bowel sounds are normal. He exhibits no distension and no mass. There is no tenderness. Musculoskeletal:   Pain at the medial epicondyle of left elbow with pronation against resistance. No swelling or erythema of elbow   Skin: Skin is warm and dry. No rash noted. Psychiatric: He has a normal mood and affect. His behavior is normal. Judgment and thought content normal.   Vitals reviewed. LDL Calculated (mg/dL)   Date Value   05/30/2019 86       ASSESSMENT/PLAN:    1. Current moderate episode of major depressive disorder without prior episode ([facility] [address]  Counseled patient on self-help books for organizational skills and improvement of managerial skills.   We will start him on Effexor and

## 2019-08-05 ENCOUNTER — OFFICE VISIT (OUTPATIENT)
Dept: PSYCHOLOGY | Age: 34
End: 2019-08-05
Payer: COMMERCIAL

## 2019-08-05 DIAGNOSIS — F32.A DEPRESSIVE DISORDER: Primary | ICD-10-CM

## 2019-08-05 PROCEDURE — 90791 PSYCH DIAGNOSTIC EVALUATION: CPT | Performed by: PSYCHOLOGIST

## 2019-08-05 ASSESSMENT — PATIENT HEALTH QUESTIONNAIRE - PHQ9
9. THOUGHTS THAT YOU WOULD BE BETTER OFF DEAD, OR OF HURTING YOURSELF: 0
5. POOR APPETITE OR OVEREATING: 0
SUM OF ALL RESPONSES TO PHQ QUESTIONS 1-9: 13
2. FEELING DOWN, DEPRESSED OR HOPELESS: 1
3. TROUBLE FALLING OR STAYING ASLEEP: 3
1. LITTLE INTEREST OR PLEASURE IN DOING THINGS: 1
6. FEELING BAD ABOUT YOURSELF - OR THAT YOU ARE A FAILURE OR HAVE LET YOURSELF OR YOUR FAMILY DOWN: 0
10. IF YOU CHECKED OFF ANY PROBLEMS, HOW DIFFICULT HAVE THESE PROBLEMS MADE IT FOR YOU TO DO YOUR WORK, TAKE CARE OF THINGS AT HOME, OR GET ALONG WITH OTHER PEOPLE: 2
4. FEELING TIRED OR HAVING LITTLE ENERGY: 3
8. MOVING OR SPEAKING SO SLOWLY THAT OTHER PEOPLE COULD HAVE NOTICED. OR THE OPPOSITE, BEING SO FIGETY OR RESTLESS THAT YOU HAVE BEEN MOVING AROUND A LOT MORE THAN USUAL: 2
SUM OF ALL RESPONSES TO PHQ9 QUESTIONS 1 & 2: 2
SUM OF ALL RESPONSES TO PHQ QUESTIONS 1-9: 13
7. TROUBLE CONCENTRATING ON THINGS, SUCH AS READING THE NEWSPAPER OR WATCHING TELEVISION: 3

## 2019-08-05 ASSESSMENT — ANXIETY QUESTIONNAIRES
2. NOT BEING ABLE TO STOP OR CONTROL WORRYING: 0-NOT AT ALL SURE
5. BEING SO RESTLESS THAT IT IS HARD TO SIT STILL: 3-NEARLY EVERY DAY
1. FEELING NERVOUS, ANXIOUS, OR ON EDGE: 1-SEVERAL DAYS
4. TROUBLE RELAXING: 2-OVER HALF THE DAYS
6. BECOMING EASILY ANNOYED OR IRRITABLE: 2-OVER HALF THE DAYS
3. WORRYING TOO MUCH ABOUT DIFFERENT THINGS: 2-OVER HALF THE DAYS
7. FEELING AFRAID AS IF SOMETHING AWFUL MIGHT HAPPEN: 0-NOT AT ALL SURE
GAD7 TOTAL SCORE: 10

## 2019-08-05 NOTE — PROGRESS NOTES
Behavioral Health Consultation  Nirmala Dobbs PsyD  Psychologist  2019  10:10 AM      Time spent with Patient: 40 minutes  This is patient's first  [facility] appointment. Reason for Consult:  Depression   Referring Provider: Samina Rooney MD  [address] [place] [address] [address], [address]    Pt provided informed consent for the behavioral health program. Discussed with patient model of service to include the limits of confidentiality (i.e. abuse reporting, suicide intervention, etc.) and short-term intervention focused approach. Pt indicated understanding. Feedback given to PCP. S:    Presenting Problem (PP): depression     Problem hx: Per Dr Masha Salamanca note on : \"here today for Depression. He started his new position as  3 months ago. He states his symptoms started about two weeks ago. Currently he has depressed mood, difficulty concentrating, fatigue, feelings of worthlessness/guilt, insomnia and weight gain. These are affecting his ability to function at work. He states at times he doesn't want to go to work. \"    UPDATE:Described self as a worrier by nature. External stress: 3 months ago, , 2can. 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 See Dr Jaelyn Fraser (female at [facility]).  Mother had 3 x MI, father has pacemaker, paternal mother  of MI, maternal father  of MI, down to 255 lbs but then gained it back, lost while carvedilol (COREG) 6.25 MG tablet Take 6.25 mg by mouth Take 1 and a half tablets a day      clopidogrel (PLAVIX) 75 MG tablet Take 75 mg by mouth      losartan (COZAAR) 25 MG tablet Take 25 mg by mouth       No current facility-administered medications for this visit. Social History:   Social History     Socioeconomic History    Marital status: Single     Spouse name: Ginny Dunaway    Number of children: 2    Years of education: Not on file    Highest education level: Not on file   Occupational History    Occupation: [employer]  :    Social Needs    Financial resource strain: Not on file    Food insecurity:     Worry: Not on file     Inability: Not on file   [place] needs:     Medical: Not on file     Non-medical: Not on file   Tobacco Use    Smoking status: Never Smoker    Smokeless tobacco: Never Used   Substance and Sexual Activity    Alcohol use: Yes     Alcohol/week: 0.0 standard drinks     Comment: Rare    Drug use: No    Sexual activity: Not on file   Lifestyle    Physical activity:     Days per week: Not on file     Minutes per session: Not on file    Stress: Not on file   Relationships    Social connections:     Talks on phone: Not on file     Gets together: Not on file     Attends Caodaism service: Not on file     Active member of club or organization: Not on file     Attends meetings of clubs or organizations: Not on file     Relationship status: Not on file    Intimate partner violence:     Fear of current or ex partner: Not on file     Emotionally abused: Not on file     Physically abused: Not on file     Forced sexual activity: Not on file   Other Topics Concern    Not on file   Social History Narrative    Not on file       TOBACCO:   reports that he has never smoked. He has never used smokeless tobacco.  ETOH:   reports that he drinks alcohol.     Family History:   Family History   Problem Relation Age of Onset    Heart Attack Mother 39        x

## 2019-08-19 ENCOUNTER — OFFICE VISIT (OUTPATIENT)
Dept: PSYCHOLOGY | Age: 34
End: 2019-08-19
Payer: COMMERCIAL

## 2019-08-19 DIAGNOSIS — F41.1 GENERALIZED ANXIETY DISORDER: Primary | ICD-10-CM

## 2019-08-19 DIAGNOSIS — F34.1 PERSISTENT DEPRESSIVE DISORDER: ICD-10-CM

## 2019-08-19 PROCEDURE — 90832 PSYTX W PT 30 MINUTES: CPT | Performed by: PSYCHOLOGIST

## 2019-08-19 NOTE — PATIENT INSTRUCTIONS
1. Continue with 60 minutes, 3-4 x per week after work, walking dog  2. Continue to take venlafaxine 75 mg daily, in the morning, go to Dr Perla Barrientos on 8/30   3. Continue to think about referral for longer term psychotherapy  4. Assertively talk with boss about need to step down from current role for now  5.  Return to clinic for Dr Rohit Barraza in 1 month

## 2019-08-19 NOTE — PROGRESS NOTES
file     Physically abused: Not on file     Forced sexual activity: Not on file   Other Topics Concern    Not on file   Social History Narrative    Not on file       TOBACCO:   reports that he has never smoked. He has never used smokeless tobacco.  ETOH:   reports that he drinks alcohol. Family History:   Family History   Problem Relation Age of Onset    Heart Attack Mother 39        x 3    Hypertension Mother     High Cholesterol Mother     High Cholesterol Father     Hypertension Father     Heart Attack Father 43    Heart Attack Maternal Grandmother         Heart attack       A:    See S: above    PHQ Scores 8/5/2019 8/2/2019 2/28/2019 10/17/2018   PHQ2 Score 2 2 0 0   PHQ9 Score 13 15 0 0     Interpretation of Total Score Depression Severity: 1-4 = Minimal depression, 5-9 = Mild depression, 10-14 = Moderate depression, 15-19 = Moderately severe depression, 20-27 = Severe depression    DRAKE 7 SCORE 8/5/2019   DRAKE-7 Total Score 10     Interpretation of DRAKE-7 score: 5-9 = mild anxiety, 10-14 = moderate anxiety, 15+ = severe anxiety. Recommend referral to behavioral health for scores 10 or greater. Denied any si/hi risk. Diagnosis:    DRAKE, Persistent depressive disorder       Diagnosis Date    Allergic rhinitis     Asthma     CAD (coronary artery disease) 10/11/2018    DEDE x 4 (DAPT x 1 year) Dr. Earline Hill    Hypercholesterolemia     Urticaria      Occupational problems    Plan:  Pt interventions:    Practiced assertive communication    Pt Behavioral Change Plan:    1. Continue with 60 minutes, 3-4 x per week after work, walking dog  2. Continue to take venlafaxine 75 mg daily, in the morning, go to Dr Shruti Sargent on 8/30   3. Continue to think about referral for longer term psychotherapy  4. Assertively talk with boss about need to step down from current role for now  5.  Return to clinic for Dr Ricci Thomas in 1 month

## 2019-08-29 PROBLEM — F32.1 CURRENT MODERATE EPISODE OF MAJOR DEPRESSIVE DISORDER WITHOUT PRIOR EPISODE (HCC): Status: ACTIVE | Noted: 2019-08-29

## 2019-08-30 ENCOUNTER — OFFICE VISIT (OUTPATIENT)
Dept: PRIMARY CARE CLINIC | Age: 34
End: 2019-08-30
Payer: COMMERCIAL

## 2019-08-30 VITALS
DIASTOLIC BLOOD PRESSURE: 77 MMHG | SYSTOLIC BLOOD PRESSURE: 121 MMHG | WEIGHT: 268.6 LBS | HEART RATE: 86 BPM | HEIGHT: 75 IN | BODY MASS INDEX: 33.4 KG/M2

## 2019-08-30 DIAGNOSIS — F32.1 CURRENT MODERATE EPISODE OF MAJOR DEPRESSIVE DISORDER WITHOUT PRIOR EPISODE (HCC): Primary | ICD-10-CM

## 2019-08-30 PROCEDURE — 1036F TOBACCO NON-USER: CPT | Performed by: FAMILY MEDICINE

## 2019-08-30 PROCEDURE — 99213 OFFICE O/P EST LOW 20 MIN: CPT | Performed by: FAMILY MEDICINE

## 2019-08-30 PROCEDURE — G8417 CALC BMI ABV UP PARAM F/U: HCPCS | Performed by: FAMILY MEDICINE

## 2019-08-30 PROCEDURE — G8427 DOCREV CUR MEDS BY ELIG CLIN: HCPCS | Performed by: FAMILY MEDICINE

## 2019-08-30 PROCEDURE — G8598 ASA/ANTIPLAT THER USED: HCPCS | Performed by: FAMILY MEDICINE

## 2019-08-30 ASSESSMENT — ENCOUNTER SYMPTOMS
SHORTNESS OF BREATH: 0
DIARRHEA: 0
CONSTIPATION: 0
VOMITING: 0
ABDOMINAL PAIN: 0
COUGH: 0
NAUSEA: 0

## 2019-08-30 ASSESSMENT — PATIENT HEALTH QUESTIONNAIRE - PHQ9
SUM OF ALL RESPONSES TO PHQ9 QUESTIONS 1 & 2: 1
2. FEELING DOWN, DEPRESSED OR HOPELESS: 0
1. LITTLE INTEREST OR PLEASURE IN DOING THINGS: 1
SUM OF ALL RESPONSES TO PHQ QUESTIONS 1-9: 1
SUM OF ALL RESPONSES TO PHQ QUESTIONS 1-9: 1

## 2019-08-30 NOTE — PROGRESS NOTES
(COREG) 6.25 MG tablet Take 6.25 mg by mouth Take 1 and a half tablets a day Yes Historical Provider, MD   clopidogrel (PLAVIX) 75 MG tablet Take 75 mg by mouth Yes Historical Provider, MD   losartan (COZAAR) 25 MG tablet Take 25 mg by mouth Yes Historical Provider, MD     Allergies   Allergen Reactions    Zocor [Simvastatin]        OBJECTIVE:    /77   Pulse 86   Ht 6' 3\" (1.905 m)   Wt 268 lb 9.6 oz (121.8 kg)   BMI 33.57 kg/m²   BP Readings from Last 2 Encounters:   08/02/19 135/77   05/30/19 135/84     Wt Readings from Last 3 Encounters:   08/02/19 277 lb (125.6 kg)   05/30/19 273 lb (123.8 kg)   02/28/19 264 lb (119.7 kg)       Physical Exam   Constitutional: He is oriented to person, place, and time. He appears well-developed and well-nourished. HENT:   Head: Normocephalic and atraumatic. Right Ear: External ear normal.   Left Ear: External ear normal.   Nose: Nose normal.   Mouth/Throat: Oropharynx is clear and moist.   Eyes: Pupils are equal, round, and reactive to light. Conjunctivae and EOM are normal.   Neck: Normal range of motion. Neck supple. Cardiovascular: Normal rate, regular rhythm, normal heart sounds and intact distal pulses. Pulmonary/Chest: Effort normal and breath sounds normal.   Abdominal: Soft. Bowel sounds are normal.   Musculoskeletal: Normal range of motion. Neurological: He is alert and oriented to person, place, and time. Skin: Skin is warm and dry. Psychiatric: He has a normal mood and affect. His behavior is normal. Judgment and thought content normal.           LDL Calculated (mg/dL)   Date Value   05/30/2019 86       ASSESSMENT/PLAN:    1.  Current moderate episode of major depressive disorder without prior episode ([facility] [address]  Improved: Continue meds and follow-up in November for chronic medical problems of hypertension coronary artery disease etc.  Patient was counseled to follow-up sooner if he feels he is worsening    Future Appointments   Date Time Provider Eris Neumann   9/16/2019 10:00 AM Rosa Perez [facility]   11/11/2019 11:30 AM [facility]       RTC Return in about 8 weeks (around 10/25/2019). Scribe attestation:  Hong Marsh LPN, am scribing for and in the presence of Alannah Min MD. Electronically signed by Lui Haddad LPN on 7/49/9299 at 4:48 AM            Provider attestation: Harry Byers MD, personally performed the services scribed by the user listed above in my presence, and it is both accurate and complete. I agree with the ROS and Past Histories independently gathered by the clinical support staff and the remaining scribed note accurately describes my personal service to the patient.     [unfilled]    8/30/2019  8:08 AM

## 2019-09-16 ENCOUNTER — OFFICE VISIT (OUTPATIENT)
Dept: PSYCHOLOGY | Age: 34
End: 2019-09-16
Payer: COMMERCIAL

## 2019-09-16 DIAGNOSIS — F34.1 PERSISTENT DEPRESSIVE DISORDER: ICD-10-CM

## 2019-09-16 DIAGNOSIS — F41.1 GENERALIZED ANXIETY DISORDER: Primary | ICD-10-CM

## 2019-09-16 PROCEDURE — 90832 PSYTX W PT 30 MINUTES: CPT | Performed by: PSYCHOLOGIST

## 2019-09-16 ASSESSMENT — PATIENT HEALTH QUESTIONNAIRE - PHQ9
2. FEELING DOWN, DEPRESSED OR HOPELESS: 0
SUM OF ALL RESPONSES TO PHQ QUESTIONS 1-9: 0
1. LITTLE INTEREST OR PLEASURE IN DOING THINGS: 0
SUM OF ALL RESPONSES TO PHQ QUESTIONS 1-9: 0
SUM OF ALL RESPONSES TO PHQ9 QUESTIONS 1 & 2: 0

## 2019-09-16 NOTE — PROGRESS NOTES
Not on file     Physically abused: Not on file     Forced sexual activity: Not on file   Other Topics Concern    Not on file   Social History Narrative    Not on file       TOBACCO:   reports that he has never smoked. He has never used smokeless tobacco.  ETOH:   reports that he drinks alcohol. Family History:   Family History   Problem Relation Age of Onset    Heart Attack Mother 39        x 3    Hypertension Mother     High Cholesterol Mother     High Cholesterol Father     Hypertension Father     Heart Attack Father 43    Heart Attack Maternal Grandmother         Heart attack       A:  See S: above    PHQ Scores 9/16/2019 8/30/2019 8/5/2019 8/2/2019 2/28/2019 10/17/2018   PHQ2 Score 0 1 2 2 0 0   PHQ9 Score 0 1 13 15 0 0     Interpretation of Total Score Depression Severity: 1-4 = Minimal depression, 5-9 = Mild depression, 10-14 = Moderate depression, 15-19 = Moderately severe depression, 20-27 = Severe depression    No si/hi risk. Diagnosis:    DRAKE, Persistent depressive disorder       Diagnosis Date    Allergic rhinitis     Asthma     CAD (coronary artery disease) 10/11/2018    DEDE x 4 (DAPT x 1 year) Dr. Jose Martin Mahmood    Hypercholesterolemia     Urticaria      no problems    Plan:  Pt interventions:    Practiced assertive communication, Trained in strategies for increasing balanced thinking and Discussed self-care (sleep, nutrition, rewarding activities, social support, exercise)    Pt Behavioral Change Plan:    1. Continue with 60 minutes, 3-4 x per week after work, walking dog  2. Continue to take venlafaxine 75 mg daily, in the morning, go to Dr Serge Puentes on 11/11  3. Continue to maintain boundaries at work, step down from new position officially as of Monday, 9/23  4.  No further follow up required with Dr Darrion Mcintyre, return as needed

## 2019-10-03 ENCOUNTER — TELEPHONE (OUTPATIENT)
Dept: PRIMARY CARE CLINIC | Age: 34
End: 2019-10-03

## 2019-10-03 RX ORDER — VENLAFAXINE HYDROCHLORIDE 37.5 MG/1
75 CAPSULE, EXTENDED RELEASE ORAL DAILY
Qty: 60 CAPSULE | Refills: 3 | Status: SHIPPED | OUTPATIENT
Start: 2019-10-03 | End: 2020-09-10

## 2019-11-11 ENCOUNTER — OFFICE VISIT (OUTPATIENT)
Dept: PRIMARY CARE CLINIC | Age: 34
End: 2019-11-11
Payer: COMMERCIAL

## 2019-11-11 VITALS
SYSTOLIC BLOOD PRESSURE: 129 MMHG | WEIGHT: 276 LBS | HEIGHT: 75 IN | HEART RATE: 92 BPM | DIASTOLIC BLOOD PRESSURE: 82 MMHG | BODY MASS INDEX: 34.32 KG/M2

## 2019-11-11 DIAGNOSIS — F32.1 CURRENT MODERATE EPISODE OF MAJOR DEPRESSIVE DISORDER WITHOUT PRIOR EPISODE (HCC): Primary | ICD-10-CM

## 2019-11-11 DIAGNOSIS — I25.10 CAD, MULTIPLE VESSEL: ICD-10-CM

## 2019-11-11 DIAGNOSIS — J45.20 MILD INTERMITTENT ASTHMA WITHOUT COMPLICATION: ICD-10-CM

## 2019-11-11 LAB
A/G RATIO: 2.1 (ref 1.1–2.2)
ALBUMIN SERPL-MCNC: 4.9 G/DL (ref 3.4–5)
ALP BLD-CCNC: 53 U/L (ref 40–129)
ALT SERPL-CCNC: 29 U/L (ref 10–40)
ANION GAP SERPL CALCULATED.3IONS-SCNC: 15 MMOL/L (ref 3–16)
AST SERPL-CCNC: 21 U/L (ref 15–37)
BILIRUB SERPL-MCNC: 0.4 MG/DL (ref 0–1)
BUN BLDV-MCNC: 14 MG/DL (ref 7–20)
CALCIUM SERPL-MCNC: 9.8 MG/DL (ref 8.3–10.6)
CHLORIDE BLD-SCNC: 97 MMOL/L (ref 99–110)
CHOLESTEROL, TOTAL: 222 MG/DL (ref 0–199)
CO2: 27 MMOL/L (ref 21–32)
CREAT SERPL-MCNC: 0.9 MG/DL (ref 0.9–1.3)
GFR AFRICAN AMERICAN: >60
GFR NON-AFRICAN AMERICAN: >60
GLOBULIN: 2.3 G/DL
GLUCOSE BLD-MCNC: 82 MG/DL (ref 70–99)
HDLC SERPL-MCNC: 66 MG/DL (ref 40–60)
LDL CHOLESTEROL CALCULATED: ABNORMAL MG/DL
LDL CHOLESTEROL DIRECT: 132 MG/DL
POTASSIUM SERPL-SCNC: 4.8 MMOL/L (ref 3.5–5.1)
SODIUM BLD-SCNC: 139 MMOL/L (ref 136–145)
TOTAL PROTEIN: 7.2 G/DL (ref 6.4–8.2)
TRIGL SERPL-MCNC: 356 MG/DL (ref 0–150)
VLDLC SERPL CALC-MCNC: ABNORMAL MG/DL

## 2019-11-11 PROCEDURE — G8427 DOCREV CUR MEDS BY ELIG CLIN: HCPCS | Performed by: FAMILY MEDICINE

## 2019-11-11 PROCEDURE — 1036F TOBACCO NON-USER: CPT | Performed by: FAMILY MEDICINE

## 2019-11-11 PROCEDURE — 99214 OFFICE O/P EST MOD 30 MIN: CPT | Performed by: FAMILY MEDICINE

## 2019-11-11 PROCEDURE — G8482 FLU IMMUNIZE ORDER/ADMIN: HCPCS | Performed by: FAMILY MEDICINE

## 2019-11-11 PROCEDURE — G8417 CALC BMI ABV UP PARAM F/U: HCPCS | Performed by: FAMILY MEDICINE

## 2019-11-11 PROCEDURE — G8598 ASA/ANTIPLAT THER USED: HCPCS | Performed by: FAMILY MEDICINE

## 2019-11-11 ASSESSMENT — ENCOUNTER SYMPTOMS
DIARRHEA: 0
COUGH: 0
SHORTNESS OF BREATH: 0
ABDOMINAL PAIN: 0
VOMITING: 0
CONSTIPATION: 0
NAUSEA: 0

## 2019-11-19 ENCOUNTER — TELEPHONE (OUTPATIENT)
Dept: FAMILY MEDICINE CLINIC | Age: 34
End: 2019-11-19

## 2020-06-23 ENCOUNTER — OFFICE VISIT (OUTPATIENT)
Dept: PRIMARY CARE CLINIC | Age: 35
End: 2020-06-23
Payer: COMMERCIAL

## 2020-06-23 VITALS — OXYGEN SATURATION: 98 % | TEMPERATURE: 98.5 F | HEART RATE: 96 BPM

## 2020-06-23 PROCEDURE — 1036F TOBACCO NON-USER: CPT | Performed by: NURSE PRACTITIONER

## 2020-06-23 PROCEDURE — G8428 CUR MEDS NOT DOCUMENT: HCPCS | Performed by: NURSE PRACTITIONER

## 2020-06-23 PROCEDURE — G8417 CALC BMI ABV UP PARAM F/U: HCPCS | Performed by: NURSE PRACTITIONER

## 2020-06-23 PROCEDURE — 99212 OFFICE O/P EST SF 10 MIN: CPT | Performed by: NURSE PRACTITIONER

## 2020-06-23 NOTE — PROGRESS NOTES
FLU/COVID-19 CLINIC  2020  Patient ID:  Parker Carpenter is a 28 y.o. male  : 1985    HPI/SYMPTOMS: Pt states that he had a positive exposure to covid through a co worker. He states he is currently asymptomatic.      Symptom duration, days:  [] 1   [] 2   [] 3   [] 4 - 7  [] 8 - 10   [] 11 - 13   [] >14    IF THE BELOW SYMPTOMS ARE NOT CHECKED, THEN THEY ARE NEGATIVE:  [] Fevers  {  [] Symptom (not measured)  [] Measured (Result:  degrees)  [] Chills  [] Cough   [] Dry   [] Productive  [] Coughing up blood    [] Short of breath  [] Rest  [] Exertion only   [] Chest pain     [] Chest tightness  [] Chest congestion  [] Nasal congestion  [] Sneezing  [] Loss of smell or taste  [] Sore throat  [] Headaches  [] Tolerable  [] Severe     [] Muscle aches  [] Nausea  [] Vomiting  []Unable to keep fluids down     [] Diarrhea  []Severe     [] OTHER SYMPTOMS:      Symptom course:   [] Worsening     [] Stable     [] Improving    RISK FACTORS (if not checked they are negative) :  [] Pregnant or possibly pregnant  [] Age over 61  [] Asthma  [] COPD/Other chronic lung diseases  [] Diabetes  [] Heart disease  [] Active Cancer  [] On Chemotherapy  [] History Lymphoma/Leukemia  [] Obesity  []Tobacco use, current  []Other:      [x] Close contact with a lab confirmed COVID-19 patient within 14 days of symptom onset    ALLERGIES  Allergies   Allergen Reactions    Zocor [Simvastatin]        Allergies   Allergen Reactions    Zocor [Simvastatin]    ,   Past Medical History:   Diagnosis Date    Allergic rhinitis     Asthma     CAD (coronary artery disease) 10/11/2018    DEDE x 4 (DAPT x 1 year) Dr. John Martinez    Hypercholesterolemia     Urticaria    ,   Past Surgical History:   Procedure Laterality Date    CARDIAC CATHETERIZATION  10/11/2018    4 Stents placed- Dr Ji Wang      Bleeding childhood   ,   Social History     Tobacco Use    Smoking status: Never Smoker    Smokeless tobacco: Never Used   Substance

## 2020-06-23 NOTE — PATIENT INSTRUCTIONS
bathroom, if available. Animals: You should restrict contact with pets and other animals while you are sick with COVID-19, just like you would around other people. Although there have not been reports of pets or other animals becoming sick with COVID-19, it is still recommended that people sick with COVID-19 limit contact with animals until more information is known about the virus. When possible, have another member of your household care for your animals while you are sick. If you are sick with COVID-19, avoid contact with your pet, including petting, snuggling, being kissed or licked, and sharing food. If you must care for your pet or be around animals while you are sick, wash your hands before and after you interact with pets and wear a facemask. Call ahead before visiting your doctor  If you have a medical appointment, call the healthcare provider and tell them that you have or may have COVID-19. This will help the healthcare providers office take steps to keep other people from getting infected or exposed. Wear a facemask  You should wear a facemask when you are around other people (e.g., sharing a room or vehicle) or pets and before you enter a healthcare providers office. If you are not able to wear a facemask (for example, because it causes trouble breathing), then people who live with you should not stay in the same room with you, or they should wear a facemask if they enter your room. Cover your coughs and sneezes  Cover your mouth and nose with a tissue when you cough or sneeze. Throw used tissues in a lined trash can. Immediately wash your hands with soap and water for at least 20 seconds or, if soap and water are not available, clean your hands with an alcohol-based hand  that contains at least 60% alcohol.   Clean your hands often  Wash your hands often with soap and water for at least 20 seconds, especially after blowing your nose, coughing, or sneezing; going to the bathroom; and before eating or preparing food. If soap and water are not readily available, use an alcohol-based hand  with at least 60% alcohol, covering all surfaces of your hands and rubbing them together until they feel dry. Soap and water are the best option if hands are visibly dirty. Avoid touching your eyes, nose, and mouth with unwashed hands. Avoid sharing personal household items  You should not share dishes, drinking glasses, cups, eating utensils, towels, or bedding with other people or pets in your home. After using these items, they should be washed thoroughly with soap and water. Clean all high-touch surfaces everyday  High touch surfaces include counters, tabletops, doorknobs, bathroom fixtures, toilets, phones, keyboards, tablets, and bedside tables. Also, clean any surfaces that may have blood, stool, or body fluids on them. Use a household cleaning spray or wipe, according to the label instructions. Labels contain instructions for safe and effective use of the cleaning product including precautions you should take when applying the product, such as wearing gloves and making sure you have good ventilation during use of the product. Monitor your symptoms  Seek prompt medical attention if your illness is worsening (e.g., difficulty breathing). Before seeking care, call your healthcare provider and tell them that you have, or are being evaluated for, COVID-19. Put on a facemask before you enter the facility. These steps will help the healthcare providers office to keep other people in the office or waiting room from getting infected or exposed. Ask your healthcare provider to call the local or state health department. Persons who are placed under active monitoring or facilitated self-monitoring should follow instructions provided by their local health department or occupational health professionals, as appropriate. When working with your local health department check their available hours.   If you have a medical emergency and need to call 911, notify the dispatch personnel that you have, or are being evaluated for COVID-19. If possible, put on a facemask before emergency medical services arrive. Discontinuing home isolation  Patients with confirmed COVID-19 should remain under home isolation precautions until the risk of secondary transmission to others is thought to be low. The decision to discontinue home isolation precautions should be made on a case-by-case basis, in consultation with healthcare providers and state and local health departments. The medication list included in this document is our record of what you are currently taking, including any changes that were made at today's visit.  If you find any differences when compared to your medications at home, or have any questions that were not answered at your visit, please contact the office.     Lora Kelly MD

## 2020-06-25 LAB
SARS-COV-2: NOT DETECTED
SOURCE: NORMAL

## 2020-07-21 RX ORDER — ICOSAPENT ETHYL 1000 MG/1
2 CAPSULE ORAL 2 TIMES DAILY
Qty: 120 CAPSULE | Refills: 5 | Status: SHIPPED | OUTPATIENT
Start: 2020-07-21 | End: 2021-03-31 | Stop reason: SDUPTHER

## 2020-07-21 NOTE — TELEPHONE ENCOUNTER
[facility]  LOV: 11/11/2019  F/U: Not scheduled   RTC Return in about 24 weeks (around 4/27/2020).           PDMP

## 2020-07-28 ENCOUNTER — OFFICE VISIT (OUTPATIENT)
Dept: PRIMARY CARE CLINIC | Age: 35
End: 2020-07-28
Payer: COMMERCIAL

## 2020-07-28 VITALS
HEART RATE: 49 BPM | WEIGHT: 287 LBS | SYSTOLIC BLOOD PRESSURE: 130 MMHG | DIASTOLIC BLOOD PRESSURE: 78 MMHG | HEIGHT: 75 IN | BODY MASS INDEX: 35.68 KG/M2 | TEMPERATURE: 97.2 F

## 2020-07-28 PROCEDURE — G8427 DOCREV CUR MEDS BY ELIG CLIN: HCPCS | Performed by: FAMILY MEDICINE

## 2020-07-28 PROCEDURE — 1036F TOBACCO NON-USER: CPT | Performed by: FAMILY MEDICINE

## 2020-07-28 PROCEDURE — 99215 OFFICE O/P EST HI 40 MIN: CPT | Performed by: FAMILY MEDICINE

## 2020-07-28 PROCEDURE — G8431 POS CLIN DEPRES SCRN F/U DOC: HCPCS | Performed by: FAMILY MEDICINE

## 2020-07-28 PROCEDURE — G0444 DEPRESSION SCREEN ANNUAL: HCPCS | Performed by: FAMILY MEDICINE

## 2020-07-28 PROCEDURE — G8417 CALC BMI ABV UP PARAM F/U: HCPCS | Performed by: FAMILY MEDICINE

## 2020-07-28 RX ORDER — VENLAFAXINE HYDROCHLORIDE 75 MG/1
75 CAPSULE, EXTENDED RELEASE ORAL DAILY
Qty: 90 CAPSULE | Refills: 1 | Status: SHIPPED | OUTPATIENT
Start: 2020-07-28 | End: 2021-01-27 | Stop reason: SDUPTHER

## 2020-07-28 ASSESSMENT — PATIENT HEALTH QUESTIONNAIRE - PHQ9
SUM OF ALL RESPONSES TO PHQ9 QUESTIONS 1 & 2: 3
10. IF YOU CHECKED OFF ANY PROBLEMS, HOW DIFFICULT HAVE THESE PROBLEMS MADE IT FOR YOU TO DO YOUR WORK, TAKE CARE OF THINGS AT HOME, OR GET ALONG WITH OTHER PEOPLE: 1
3. TROUBLE FALLING OR STAYING ASLEEP: 3
4. FEELING TIRED OR HAVING LITTLE ENERGY: 3
2. FEELING DOWN, DEPRESSED OR HOPELESS: 1
5. POOR APPETITE OR OVEREATING: 3
6. FEELING BAD ABOUT YOURSELF - OR THAT YOU ARE A FAILURE OR HAVE LET YOURSELF OR YOUR FAMILY DOWN: 1
1. LITTLE INTEREST OR PLEASURE IN DOING THINGS: 2
7. TROUBLE CONCENTRATING ON THINGS, SUCH AS READING THE NEWSPAPER OR WATCHING TELEVISION: 3
SUM OF ALL RESPONSES TO PHQ QUESTIONS 1-9: 16
8. MOVING OR SPEAKING SO SLOWLY THAT OTHER PEOPLE COULD HAVE NOTICED. OR THE OPPOSITE, BEING SO FIGETY OR RESTLESS THAT YOU HAVE BEEN MOVING AROUND A LOT MORE THAN USUAL: 0
SUM OF ALL RESPONSES TO PHQ QUESTIONS 1-9: 16
9. THOUGHTS THAT YOU WOULD BE BETTER OFF DEAD, OR OF HURTING YOURSELF: 0

## 2020-07-28 ASSESSMENT — ENCOUNTER SYMPTOMS
SORE THROAT: 0
DIARRHEA: 0
CONSTIPATION: 0
COUGH: 0
NAUSEA: 0
SHORTNESS OF BREATH: 0
ABDOMINAL PAIN: 0
COLOR CHANGE: 0
EYE PAIN: 0
VOMITING: 0
RHINORRHEA: 0

## 2020-07-28 NOTE — PROGRESS NOTES
Chief Complaint   Patient presents with    Coronary Artery Disease    Hyperlipidemia    Asthma    Depression    Obesity       HPI:  Domingo Santos is a 28 y.o. (: 1985) here today for multiple medical problems. He is compliant with his coronary artery disease medications  without Lightheadedness, Urinary Frequency, Edema and Sedation  He has dyspnea when: Walking up 4 flights of stairs  He does not have chest pain when: Walking up 4 flights of stairs    He is compliant with his cholesterol medication without myalgia and abdominal pain    He is not  taking any medications for his asthma. He has not had any significant ongoing wheezing or shortness of breath. He is not compliant with his  medication for depression. He reports these have been effective and he stoped taking it about a year ago. Currently he has depressed mood, difficulty concentrating, fatigue, insomnia and weight gain. These are affecting his ability to function at work and at home. He would like to discuss restarting the medication. Patient cites health, increased physical ability as reasons for wanting to lose weight. Thinks they have trouble with weight due to: lack of physical activity and food choices. Weight loss techniques attempted: self-directed dieting  Comorbidities: dyslipidemias and hypertension     PHQ-9 Total Score: 16 (2020  3:52 PM)  Thoughts that you would be better off dead, or of hurting yourself in some way: 0 (2020  3:52 PM)        Review of Systems   Constitutional: Negative for chills and fever. HENT: Negative for ear pain, rhinorrhea and sore throat. Eyes: Negative for pain and visual disturbance. Respiratory: Negative for cough and shortness of breath. Cardiovascular: Negative for chest pain and palpitations. Gastrointestinal: Negative for abdominal pain, constipation, diarrhea, nausea and vomiting. Genitourinary: Negative for dysuria and frequency.    Musculoskeletal: Negative Bleeding childhood     Family History   Problem Relation Age of Onset    Heart Attack Mother 39        x 3    Hypertension Mother     High Cholesterol Mother     High Cholesterol Father     Hypertension Father     Heart Attack Father 43    Heart Attack Maternal Grandmother         Heart attack     Social History     Tobacco Use    Smoking status: Never Smoker    Smokeless tobacco: Never Used   Substance Use Topics    Alcohol use: Yes     Alcohol/week: 0.0 standard drinks     Comment: Rare    Drug use: No       Objective   /78   Pulse (!) 49   Temp 97.2 °F (36.2 °C)   Ht 6' 3\" (1.905 m)   Wt 287 lb (130.2 kg)   BMI 35.87 kg/m²   Wt Readings from Last 3 Encounters:   11/11/19 276 lb (125.2 kg)   08/30/19 268 lb 9.6 oz (121.8 kg)   08/02/19 277 lb (125.6 kg)       Physical Exam  Constitutional:       Appearance: He is well-developed. He is obese. HENT:      Head: Normocephalic and atraumatic. Nose: Nose normal.      Mouth/Throat:      Pharynx: No oropharyngeal exudate. Eyes:      General: No scleral icterus. Right eye: No discharge. Left eye: No discharge. Pupils: Pupils are equal, round, and reactive to light. Neck:      Musculoskeletal: Normal range of motion and neck supple. Thyroid: No thyromegaly. Cardiovascular:      Rate and Rhythm: Normal rate and regular rhythm. Pulses:           Dorsalis pedis pulses are 2+ on the right side and 2+ on the left side. Posterior tibial pulses are 2+ on the right side and 2+ on the left side. Heart sounds: Normal heart sounds. No murmur. No friction rub. No gallop. Comments: No Edema Lower Extremities  Pulmonary:      Effort: Pulmonary effort is normal.      Breath sounds: Normal breath sounds. No wheezing or rales. Abdominal:      General: Bowel sounds are normal. There is no distension. Palpations: Abdomen is soft. There is no hepatomegaly or splenomegaly. Tenderness:  There is no abdominal tenderness. There is no guarding or rebound. Musculoskeletal: Normal range of motion. General: No tenderness or deformity. Lymphadenopathy:      Cervical: No cervical adenopathy. Skin:     General: Skin is warm and dry. Findings: No erythema or rash. Neurological:      Mental Status: He is alert. Cranial Nerves: No cranial nerve deficit. Sensory: No sensory deficit. Gait: Gait normal.   Psychiatric:         Speech: Speech normal.         Behavior: Behavior normal.           Chemistry        Component Value Date/Time     11/11/2019 1224    K 4.8 11/11/2019 1224    CL 97 (L) 11/11/2019 1224    CO2 27 11/11/2019 1224    BUN 14 11/11/2019 1224    CREATININE 0.9 11/11/2019 1224        Component Value Date/Time    CALCIUM 9.8 11/11/2019 1224    ALKPHOS 53 11/11/2019 1224    AST 21 11/11/2019 1224    ALT 29 11/11/2019 1224    BILITOT 0.4 11/11/2019 1224          Lab Results   Component Value Date    WBC 9.3 12/28/2017    HGB 15.7 12/28/2017    HCT 46.2 12/28/2017    MCV 87.5 12/28/2017     12/28/2017     No results found for: LABA1C  No results found for: EAG  No results found for: LABA1C  No components found for: CHLPL  Lab Results   Component Value Date    TRIG 356 (H) 11/11/2019    TRIG 299 (H) 05/30/2019    TRIG 630 (H) 09/01/2016     Lab Results   Component Value Date    HDL 66 (H) 11/11/2019    HDL 53 05/30/2019    HDL 50 09/01/2016     Lab Results   Component Value Date    LDLCALC see below 11/11/2019    [address] 05/30/2019    LDLCALC see below 09/01/2016     Lab Results   Component Value Date    LABVLDL see below 11/11/2019    LABVLDL 60 05/30/2019    LABVLDL see below 09/01/2016         Assessment   Plan     1. CAD, multiple vessel  Controlled: Appears stable. We will continue current management and monitor for adverse reaction and disease progression. Follow-up as noted below      2.  Hypercholesterolemia  We will recheck in 6 weeks plan to check labs

## 2020-07-28 NOTE — PATIENT INSTRUCTIONS
Twelve Rules for life (from [address] [facility]):    1)  Stand up straight with your shoulders back  2)  Treat yourself like someone you are responsible for helping  3)  Make friends with people who want the best for you  4)  Compare yourself to who you were yesterday, not to who someone else is today  5)  Do not let your children do anything that makes you dislike them  6)  Set your house in perfect order before you criticize the world  7)  Pursue what is meaningful (not what is expedient)  8)  Tell the truth - or, at least, don't lie  9)  Assume that the person you are listening to might know something you don't  10) Be precise in your speech  11) Do not bother children when they are skateboarding  12) Pet a cat when you encounter one on the street      Examine your lifestyle and the barriers to bad and good habits and how you can design your life to make better choices    If you want to feel better these are the [address] [place] [address] Blvd of Wellness:    Make it EASY to do the [address] 1)  You can choose to Get 150 min/week of moderate exercise (can talk but can't sing) or 75 min/week of vigorous exercise (can't talk)   This will enhance your sense of well being (Exercise is as good as medicine for depression.)    2)  You can choose to Get 7-9 hours of sleep per night    Detoxifies your brain, reduces risk of dementia    3)  You can choose to Strength Train 2 x a week on non-consecutive days   This will improve function and reduce risk of injury. Body weight type exercises such as Yoga and Pilates are good    4)  You can choose good nutrition. Only eat your goal weight (in lbs) x 10 calories/day and get 5 servings of Vegetables/day   Plant based diets reduce risk of heart attack/stroke and will help you feel full on less food. Avoid highly processed foods and processed carbohydrates.     5)  You can choose moderate alcohol intake < 1-2 drinks/day   Alcohol will disrupt your sleep and add calories to your

## 2020-09-10 ENCOUNTER — OFFICE VISIT (OUTPATIENT)
Dept: PRIMARY CARE CLINIC | Age: 35
End: 2020-09-10
Payer: COMMERCIAL

## 2020-09-10 VITALS
DIASTOLIC BLOOD PRESSURE: 72 MMHG | HEART RATE: 96 BPM | BODY MASS INDEX: 35.32 KG/M2 | SYSTOLIC BLOOD PRESSURE: 118 MMHG | WEIGHT: 282.6 LBS | TEMPERATURE: 97.4 F

## 2020-09-10 DIAGNOSIS — E78.00 HYPERCHOLESTEROLEMIA: ICD-10-CM

## 2020-09-10 LAB
A/G RATIO: 2.4 (ref 1.1–2.2)
ALBUMIN SERPL-MCNC: 4.7 G/DL (ref 3.4–5)
ALP BLD-CCNC: 50 U/L (ref 40–129)
ALT SERPL-CCNC: 55 U/L (ref 10–40)
ANION GAP SERPL CALCULATED.3IONS-SCNC: 14 MMOL/L (ref 3–16)
AST SERPL-CCNC: 43 U/L (ref 15–37)
BILIRUB SERPL-MCNC: 0.4 MG/DL (ref 0–1)
BUN BLDV-MCNC: 15 MG/DL (ref 7–20)
CALCIUM SERPL-MCNC: 9.7 MG/DL (ref 8.3–10.6)
CHLORIDE BLD-SCNC: 102 MMOL/L (ref 99–110)
CHOLESTEROL, TOTAL: 195 MG/DL (ref 0–199)
CO2: 25 MMOL/L (ref 21–32)
CREAT SERPL-MCNC: 1 MG/DL (ref 0.9–1.3)
GFR AFRICAN AMERICAN: >60
GFR NON-AFRICAN AMERICAN: >60
GLOBULIN: 2 G/DL
GLUCOSE BLD-MCNC: 105 MG/DL (ref 70–99)
HDLC SERPL-MCNC: 54 MG/DL (ref 40–60)
LDL CHOLESTEROL CALCULATED: ABNORMAL MG/DL
LDL CHOLESTEROL DIRECT: 94 MG/DL
POTASSIUM SERPL-SCNC: 4.3 MMOL/L (ref 3.5–5.1)
SODIUM BLD-SCNC: 141 MMOL/L (ref 136–145)
TOTAL PROTEIN: 6.7 G/DL (ref 6.4–8.2)
TRIGL SERPL-MCNC: 458 MG/DL (ref 0–150)
VLDLC SERPL CALC-MCNC: ABNORMAL MG/DL

## 2020-09-10 PROCEDURE — 99214 OFFICE O/P EST MOD 30 MIN: CPT | Performed by: FAMILY MEDICINE

## 2020-09-10 PROCEDURE — G8427 DOCREV CUR MEDS BY ELIG CLIN: HCPCS | Performed by: FAMILY MEDICINE

## 2020-09-10 PROCEDURE — 1036F TOBACCO NON-USER: CPT | Performed by: FAMILY MEDICINE

## 2020-09-10 PROCEDURE — G8417 CALC BMI ABV UP PARAM F/U: HCPCS | Performed by: FAMILY MEDICINE

## 2020-09-10 ASSESSMENT — PATIENT HEALTH QUESTIONNAIRE - PHQ9
4. FEELING TIRED OR HAVING LITTLE ENERGY: 1
SUM OF ALL RESPONSES TO PHQ QUESTIONS 1-9: 8
SUM OF ALL RESPONSES TO PHQ QUESTIONS 1-9: 8
1. LITTLE INTEREST OR PLEASURE IN DOING THINGS: 1
8. MOVING OR SPEAKING SO SLOWLY THAT OTHER PEOPLE COULD HAVE NOTICED. OR THE OPPOSITE, BEING SO FIGETY OR RESTLESS THAT YOU HAVE BEEN MOVING AROUND A LOT MORE THAN USUAL: 0
10. IF YOU CHECKED OFF ANY PROBLEMS, HOW DIFFICULT HAVE THESE PROBLEMS MADE IT FOR YOU TO DO YOUR WORK, TAKE CARE OF THINGS AT HOME, OR GET ALONG WITH OTHER PEOPLE: 0
3. TROUBLE FALLING OR STAYING ASLEEP: 0
7. TROUBLE CONCENTRATING ON THINGS, SUCH AS READING THE NEWSPAPER OR WATCHING TELEVISION: 3
2. FEELING DOWN, DEPRESSED OR HOPELESS: 1
6. FEELING BAD ABOUT YOURSELF - OR THAT YOU ARE A FAILURE OR HAVE LET YOURSELF OR YOUR FAMILY DOWN: 1
9. THOUGHTS THAT YOU WOULD BE BETTER OFF DEAD, OR OF HURTING YOURSELF: 0
5. POOR APPETITE OR OVEREATING: 1
SUM OF ALL RESPONSES TO PHQ9 QUESTIONS 1 & 2: 2

## 2020-09-10 ASSESSMENT — ENCOUNTER SYMPTOMS
DIARRHEA: 0
NAUSEA: 0
CONSTIPATION: 0
ABDOMINAL PAIN: 0
SHORTNESS OF BREATH: 0
COUGH: 0
VOMITING: 0

## 2020-09-10 NOTE — PROGRESS NOTES
Chief Complaint   Patient presents with    Depression    Obesity         HPI:  Farooq Rodriguez is a 28 y.o. (:1985) here today   for multiple medical issues      PHQ-9 Total Score: 8 (9/10/2020  1:20 PM)  Thoughts that you would be better off dead, or of hurting yourself in some way: 0 (9/10/2020  1:20 PM)  +  He notes his mood is good. Continues with a good bit of anxiety related to work still but feels supported at work  And that they are trying to place him in roles that he can manage. He has been working on weight and managed to affect some weight loss but has relapsed on diet some. He is also working out more consistently. He has been taking and tolerating his blood pressure medicine. There is no lightheadedness or dizziness. Denies any chest pain or palpitation. He is taking and tolerating his cholesterol medicine without abdominal pain or muscle aches. Review of Systems   Constitutional: Negative for chills and fever. Respiratory: Negative for cough and shortness of breath. Cardiovascular: Negative for chest pain and palpitations. Gastrointestinal: Negative for abdominal pain, constipation, diarrhea, nausea and vomiting. Endocrine: Negative for polyuria. Genitourinary: Negative for dysuria.        Past Medical History:   Diagnosis Date    Allergic rhinitis     Asthma     CAD (coronary artery disease) 10/11/2018    DEDE x 4 (DAPT x 1 year) Dr. Manuel Coad    Hypercholesterolemia     Urticaria      Family History   Problem Relation Age of Onset    Heart Attack Mother 39        x 3    Hypertension Mother     High Cholesterol Mother     High Cholesterol Father     Hypertension Father     Heart Attack Father 43    Heart Attack Maternal Grandmother         Heart attack     Social History     Socioeconomic History    Marital status: Single     Spouse name: Ximena Coffman    Number of children: 2    Years of education: Not on file    Highest education level: Not on file Occupational History    Occupation: [employer]  :    Social Needs    Financial resource strain: Not on file    Food insecurity     Worry: Not on file     Inability: Not on file   [employer] needs     Medical: Not on file     Non-medical: Not on file   Tobacco Use    Smoking status: Never Smoker    Smokeless tobacco: Never Used   Substance and Sexual Activity    Alcohol use: Yes     Alcohol/week: 0.0 standard drinks     Comment: Rare    Drug use: No    Sexual activity: Not on file   Lifestyle    Physical activity     Days per week: Not on file     Minutes per session: Not on file    Stress: Not on file   Relationships    Social connections     Talks on phone: Not on file     Gets together: Not on file     Attends Holiness service: Not on file     Active member of club or organization: Not on file     Attends meetings of clubs or organizations: Not on file     Relationship status: Not on file    Intimate partner violence     Fear of current or ex partner: Not on file     Emotionally abused: Not on file     Physically abused: Not on file     Forced sexual activity: Not on file   Other Topics Concern    Not on file   Social History Narrative    Not on file       New Prescriptions    No medications on file         Meds Prior to visit:  Prior to Visit Medications    Medication Sig Taking?  Authorizing Provider   Icosapent Ethyl (VASCEPA) 1 g CAPS capsule Take 2 capsules by mouth 2 times daily BIN# [id number]  PCN# CN  GRP# [id number]  ID# [id number] Yes Curly Modi MD   ASPIRIN LOW DOSE 81 MG EC tablet TK 1 T PO D Yes Historical Provider, MD   ezetimibe (ZETIA) 10 MG tablet Take 10 mg by mouth Yes Historical Provider, MD   atorvastatin (LIPITOR) 80 MG tablet Take 80 mg by mouth Yes Historical Provider, MD   carvedilol (COREG) 6.25 MG tablet Take 6.25 mg by mouth Take 1 and a half tablets a day Yes Historical Provider, MD   losartan (COZAAR) 25 MG tablet Take 25 mg by mouth Yes Historical Provider, MD   venlafaxine (EFFEXOR XR) 75 MG extended release capsule Take 1 capsule by mouth daily  Francoise Andrew MD   triamcinolone (KENALOG) 0.1 % ointment APPLY EXTERNALLY TO THE AFFECTED AREA TWICE DAILY  Patient not taking: Reported on 9/10/2020  Francoise Andrew MD     Allergies   Allergen Reactions    Zocor [Simvastatin]        OBJECTIVE:    /72   Pulse 96   Temp 97.4 °F (36.3 °C) (Oral)   Wt 282 lb 9.6 oz (128.2 kg)   BMI 35.32 kg/m²   BP Readings from Last 2 Encounters:   07/28/20 130/78   11/11/19 129/82     Wt Readings from Last 3 Encounters:   07/28/20 287 lb (130.2 kg)   11/11/19 276 lb (125.2 kg)   08/30/19 268 lb 9.6 oz (121.8 kg)       Physical Exam  Constitutional:       Appearance: He is well-developed. He is obese. Cardiovascular:      Rate and Rhythm: Normal rate and regular rhythm. Heart sounds: No murmur. No friction rub. No gallop. Pulmonary:      Effort: Pulmonary effort is normal.      Breath sounds: Normal breath sounds. No wheezing or rales. Abdominal:      General: Bowel sounds are normal. There is no distension. Palpations: Abdomen is soft. There is no mass. Tenderness: There is no abdominal tenderness. Skin:     General: Skin is warm and dry. Findings: No rash. Psychiatric:         Mood and Affect: Mood normal.         Behavior: Behavior normal.         Thought Content: Thought content normal.             LDL Calculated (mg/dL)   Date Value   11/11/2019 see below       ASSESSMENT/PLAN:    1. Current moderate episode of major depressive disorder without prior episode ([facility] [address]  Controlled: Appears stable. We will continue current management and monitor for adverse reaction and disease progression. Follow-up as noted below  Anxiety is fairly well controlled as well    2.  Class 1 obesity due to excess calories with serious comorbidity and body mass index (BMI) of 34.0 to 34.9 in adult  Stable: Counseled on diet and exercise he is planning to work more aggressively on that    3. Elevated blood pressure reading in office without diagnosis of hypertension  Patient likely has hypertension as he is on medications for his coronary artery disease that reduce blood pressure. We will ask him to check some ambulatory pressures in the meantime  [facility]    4. Hypercholesterolemia  We will check labs and follow-up in 6 months  - Comprehensive Metabolic Panel; Future  - Lipid Panel; Future      RTC Return in about 24 weeks (around 2/25/2021). Scribe attestation:  Nazia Zamudio LPN, am scribing for and in the presence of Barb Dumont MD. Electronically signed by Brown English LPN on 1/55/3130 at 7:50 PM            Provider attestation: Kevin Zarate MD, personally performed the services scribed by the user listed above in my presence, and it is both accurate and complete. I agree with the ROS and Past Histories independently gathered by the clinical support staff and the remaining scribed note accurately describes my personal service to the patient.     [unfilled]    9/10/2020  1:26 PM

## 2020-09-24 ENCOUNTER — PATIENT MESSAGE (OUTPATIENT)
Dept: PRIMARY CARE CLINIC | Age: 35
End: 2020-09-24

## 2020-09-24 RX ORDER — CARVEDILOL 6.25 MG/1
6.25 TABLET ORAL 2 TIMES DAILY WITH MEALS
Qty: 60 TABLET | Refills: 5 | Status: SHIPPED | OUTPATIENT
Start: 2020-09-24 | End: 2020-09-28 | Stop reason: SDUPTHER

## 2020-09-24 NOTE — TELEPHONE ENCOUNTER
From: Charo Zimmerman  To: Yumi Phan MD  Sent: 9/24/2020 9:17 AM EDT  Subject: Prescription Question    Trying to get get my medication filled. The Pharmacy has reached out and got no reply and I can not refill through [address]    Carvediol 6.25mg tablet

## 2020-09-28 RX ORDER — CARVEDILOL 6.25 MG/1
6.25 TABLET ORAL 2 TIMES DAILY WITH MEALS
Qty: 60 TABLET | Refills: 5 | Status: SHIPPED | OUTPATIENT
Start: 2020-09-28 | End: 2021-03-31 | Stop reason: SDUPTHER

## 2020-09-28 NOTE — TELEPHONE ENCOUNTER
Please clarify sig. 9/24/20 script  Sig: Take 1 tablet by mouth 2 times daily (with meals) Take 1 and a half tablets a day

## 2020-09-28 NOTE — TELEPHONE ENCOUNTER
MINERVA CALLED REQUESTING CLARIFICATION ON DIRECTIONS ON   Rx  -Carvedilol  REQUESTING A CALL BACK   [phone]

## 2021-01-27 DIAGNOSIS — F32.1 CURRENT MODERATE EPISODE OF MAJOR DEPRESSIVE DISORDER WITHOUT PRIOR EPISODE (HCC): ICD-10-CM

## 2021-01-27 RX ORDER — VENLAFAXINE HYDROCHLORIDE 75 MG/1
75 CAPSULE, EXTENDED RELEASE ORAL DAILY
Qty: 90 CAPSULE | Refills: 1 | Status: SHIPPED | OUTPATIENT
Start: 2021-01-27 | End: 2021-04-28

## 2021-03-31 ENCOUNTER — OFFICE VISIT (OUTPATIENT)
Dept: PRIMARY CARE CLINIC | Age: 36
End: 2021-03-31
Payer: COMMERCIAL

## 2021-03-31 VITALS
WEIGHT: 285.2 LBS | BODY MASS INDEX: 35.46 KG/M2 | HEIGHT: 75 IN | TEMPERATURE: 97.2 F | DIASTOLIC BLOOD PRESSURE: 76 MMHG | SYSTOLIC BLOOD PRESSURE: 126 MMHG | HEART RATE: 100 BPM

## 2021-03-31 DIAGNOSIS — I25.10 CAD, MULTIPLE VESSEL: ICD-10-CM

## 2021-03-31 DIAGNOSIS — L81.9 PIGMENTED SKIN LESION OF UNCERTAIN NATURE: ICD-10-CM

## 2021-03-31 DIAGNOSIS — I10 ESSENTIAL HYPERTENSION: ICD-10-CM

## 2021-03-31 DIAGNOSIS — E78.00 HYPERCHOLESTEROLEMIA: ICD-10-CM

## 2021-03-31 DIAGNOSIS — E66.09 CLASS 1 OBESITY DUE TO EXCESS CALORIES WITH SERIOUS COMORBIDITY AND BODY MASS INDEX (BMI) OF 34.0 TO 34.9 IN ADULT: ICD-10-CM

## 2021-03-31 DIAGNOSIS — R73.09 ELEVATED GLUCOSE: ICD-10-CM

## 2021-03-31 DIAGNOSIS — F32.1 CURRENT MODERATE EPISODE OF MAJOR DEPRESSIVE DISORDER WITHOUT PRIOR EPISODE (HCC): Primary | ICD-10-CM

## 2021-03-31 LAB
A/G RATIO: 1.6 (ref 1.1–2.2)
ALBUMIN SERPL-MCNC: 4.6 G/DL (ref 3.4–5)
ALP BLD-CCNC: 50 U/L (ref 40–129)
ALT SERPL-CCNC: 63 U/L (ref 10–40)
ANION GAP SERPL CALCULATED.3IONS-SCNC: 11 MMOL/L (ref 3–16)
AST SERPL-CCNC: 45 U/L (ref 15–37)
BILIRUB SERPL-MCNC: 0.5 MG/DL (ref 0–1)
BUN BLDV-MCNC: 13 MG/DL (ref 7–20)
CALCIUM SERPL-MCNC: 9.5 MG/DL (ref 8.3–10.6)
CHLORIDE BLD-SCNC: 101 MMOL/L (ref 99–110)
CO2: 28 MMOL/L (ref 21–32)
CREAT SERPL-MCNC: 1 MG/DL (ref 0.9–1.3)
GFR AFRICAN AMERICAN: >60
GFR NON-AFRICAN AMERICAN: >60
GLOBULIN: 2.8 G/DL
GLUCOSE BLD-MCNC: 94 MG/DL (ref 70–99)
HBA1C MFR BLD: 5.6 %
POTASSIUM SERPL-SCNC: 4.4 MMOL/L (ref 3.5–5.1)
SODIUM BLD-SCNC: 140 MMOL/L (ref 136–145)
TOTAL PROTEIN: 7.4 G/DL (ref 6.4–8.2)

## 2021-03-31 PROCEDURE — 96127 BRIEF EMOTIONAL/BEHAV ASSMT: CPT | Performed by: FAMILY MEDICINE

## 2021-03-31 PROCEDURE — G8427 DOCREV CUR MEDS BY ELIG CLIN: HCPCS | Performed by: FAMILY MEDICINE

## 2021-03-31 PROCEDURE — G8484 FLU IMMUNIZE NO ADMIN: HCPCS | Performed by: FAMILY MEDICINE

## 2021-03-31 PROCEDURE — G8417 CALC BMI ABV UP PARAM F/U: HCPCS | Performed by: FAMILY MEDICINE

## 2021-03-31 PROCEDURE — 1036F TOBACCO NON-USER: CPT | Performed by: FAMILY MEDICINE

## 2021-03-31 PROCEDURE — 83036 HEMOGLOBIN GLYCOSYLATED A1C: CPT | Performed by: FAMILY MEDICINE

## 2021-03-31 PROCEDURE — 99214 OFFICE O/P EST MOD 30 MIN: CPT | Performed by: FAMILY MEDICINE

## 2021-03-31 RX ORDER — CARVEDILOL 6.25 MG/1
6.25 TABLET ORAL 2 TIMES DAILY WITH MEALS
Qty: 60 TABLET | Refills: 5 | Status: SHIPPED | OUTPATIENT
Start: 2021-03-31 | End: 2021-04-06

## 2021-03-31 RX ORDER — ICOSAPENT ETHYL 1000 MG/1
2 CAPSULE ORAL 2 TIMES DAILY
Qty: 120 CAPSULE | Refills: 5 | Status: SHIPPED | OUTPATIENT
Start: 2021-03-31 | End: 2022-03-03

## 2021-03-31 RX ORDER — EZETIMIBE 10 MG/1
10 TABLET ORAL DAILY
Qty: 90 TABLET | Refills: 3 | Status: SHIPPED | OUTPATIENT
Start: 2021-03-31 | End: 2022-03-03 | Stop reason: SDUPTHER

## 2021-03-31 RX ORDER — LOSARTAN POTASSIUM 25 MG/1
25 TABLET ORAL DAILY
Qty: 90 TABLET | Refills: 3 | Status: SHIPPED | OUTPATIENT
Start: 2021-03-31 | End: 2022-03-03 | Stop reason: SDUPTHER

## 2021-03-31 RX ORDER — ATORVASTATIN CALCIUM 80 MG/1
80 TABLET, FILM COATED ORAL EVERY EVENING
Qty: 90 TABLET | Refills: 3 | Status: SHIPPED | OUTPATIENT
Start: 2021-03-31 | End: 2022-03-03 | Stop reason: SDUPTHER

## 2021-03-31 ASSESSMENT — PATIENT HEALTH QUESTIONNAIRE - PHQ9
5. POOR APPETITE OR OVEREATING: 0
SUM OF ALL RESPONSES TO PHQ QUESTIONS 1-9: 12
7. TROUBLE CONCENTRATING ON THINGS, SUCH AS READING THE NEWSPAPER OR WATCHING TELEVISION: 2
9. THOUGHTS THAT YOU WOULD BE BETTER OFF DEAD, OR OF HURTING YOURSELF: 0
SUM OF ALL RESPONSES TO PHQ QUESTIONS 1-9: 12
SUM OF ALL RESPONSES TO PHQ9 QUESTIONS 1 & 2: 4
2. FEELING DOWN, DEPRESSED OR HOPELESS: 2
SUM OF ALL RESPONSES TO PHQ QUESTIONS 1-9: 12

## 2021-03-31 ASSESSMENT — COLUMBIA-SUICIDE SEVERITY RATING SCALE - C-SSRS: 6. HAVE YOU EVER DONE ANYTHING, STARTED TO DO ANYTHING, OR PREPARED TO DO ANYTHING TO END YOUR LIFE?: NO

## 2021-03-31 NOTE — PROGRESS NOTES
PHQ-9 Total Score: 12 (3/31/2021  9:27 AM)  Thoughts that you would be better off dead, or of hurting yourself in some way: 0 (3/31/2021  9:27 AM)

## 2021-03-31 NOTE — Clinical Note
Pinky Rodgers,  One of theses looks benign, the other has an outside chance to be a BCC.   Thanks,  Baltazar Merida

## 2021-03-31 NOTE — PROGRESS NOTES
Chief Complaint   Patient presents with    Depression    Hypertension    Hyperlipidemia    Obesity       HPI: Sanam Hunter presents for evaluation and management of pression, hypertension, cholesterol and no obesity. Sanam Hunter notes he is struggling with worsening depression despite using his Effexor as directed. He notes poor concentration, feeling like he is \"all over the place \"needing increased sleep but having trouble getting to sleep and having early morning wakening this. Tells me he has to force himself to get out of bed and he has decreased motivation. PHQ Scores 3/31/2021 9/10/2020 7/28/2020 9/16/2019 8/30/2019 8/5/2019 8/2/2019   PHQ2 Score 4 2 3 0 1 2 2   PHQ9 Score 12 8 16 0 1 13 15     Interpretation of Total Score Depression Severity: 1-4 = Minimal depression, 5-9 = Mild depression, 10-14 = Moderate depression, 15-19 = Moderately severe depression, 20-27 = Severe depression    He denies thoughts of self-harm or suicide. He tells me he has run out of his blood pressure medicine and needs refills. He tells me he is also out of his cholesterol medicine. He reports the slow growth of 2 lesions on the right side of his nose that he would like to have removed.     He has a history of elevated glucose but denies polyuria or visual changes      Review of Systems    Allergies   Allergen Reactions    Zocor [Simvastatin]      New Prescriptions    CARIPRAZINE HCL (VRAYLAR) 1.5 MG CAPSULE    Take 1 capsule by mouth daily     Current Outpatient Medications   Medication Sig Dispense Refill    carvedilol (COREG) 6.25 MG tablet Take 1 tablet by mouth 2 times daily (with meals) Take 1 and a half tablets a day 60 tablet 5    Icosapent Ethyl (VASCEPA) 1 g CAPS capsule Take 2 capsules by mouth 2 times daily BIN# [id number]  PCN# CN  [facility]# [id number]  ID# [id number] 120 capsule 5    ezetimibe (ZETIA) 10 MG tablet Take 1 tablet by mouth daily 90 tablet 3    atorvastatin (LIPITOR) 80 MG tablet Take 1 tablet by mouth every evening 90 tablet 3    losartan (COZAAR) 25 MG tablet Take 1 tablet by mouth daily 90 tablet 3    cariprazine hcl (VRAYLAR) 1.5 MG capsule Take 1 capsule by mouth daily 30 capsule 1    venlafaxine (EFFEXOR XR) 75 MG extended release capsule Take 1 capsule by mouth daily 90 capsule 1    ASPIRIN LOW DOSE 81 MG EC tablet TK 1 T PO D  10    triamcinolone (KENALOG) 0.1 % ointment APPLY EXTERNALLY TO THE AFFECTED AREA TWICE DAILY (Patient not taking: Reported on 9/10/2020) 30 g 0     No current facility-administered medications for this visit. Past Medical History:   Diagnosis Date    Allergic rhinitis     Asthma     CAD (coronary artery disease) 10/11/2018    DEDE x 4 (DAPT x 1 year) Dr. Amber Perez    Hypercholesterolemia     Urticaria          Objective   /76   Pulse 100   Temp 97.2 °F (36.2 °C) (Temporal)   Ht 6' 3\" (1.905 m)   Wt 285 lb 3.2 oz (129.4 kg)   BMI 35.65 kg/m²   Wt Readings from Last 3 Encounters:   03/31/21 285 lb 3.2 oz (129.4 kg)   09/10/20 282 lb 9.6 oz (128.2 kg)   07/28/20 287 lb (130.2 kg)     . phq  Physical Exam  Constitutional:       Appearance: He is well-developed. HENT:      Nose:      Comments: Sessile papule on right side and notes approximately 4 mm across appears shiny  Pedunculated papular lesion on the right nasal ala  Cardiovascular:      Rate and Rhythm: Normal rate and regular rhythm. Heart sounds: No murmur. No friction rub. No gallop. Pulmonary:      Effort: Pulmonary effort is normal.      Breath sounds: Normal breath sounds. No wheezing or rales. Abdominal:      General: Bowel sounds are normal. There is no distension. Palpations: Abdomen is soft. There is no mass. Tenderness: There is no abdominal tenderness. Skin:     General: Skin is warm and dry. Findings: No rash. Psychiatric:         Mood and Affect: Mood normal.         Behavior: Behavior normal.         Thought Content:  Thought content normal.           Chemistry Component Value Date/Time     09/10/2020 1416    K 4.3 09/10/2020 1416     09/10/2020 1416    CO2 25 09/10/2020 1416    BUN 15 09/10/2020 1416    CREATININE 1.0 09/10/2020 1416        Component Value Date/Time    CALCIUM 9.7 09/10/2020 1416    ALKPHOS 50 09/10/2020 1416    AST 43 (H) 09/10/2020 1416    ALT 55 (H) 09/10/2020 1416    BILITOT 0.4 09/10/2020 1416          Lab Results   Component Value Date    WBC 9.3 12/28/2017    HGB 15.7 12/28/2017    HCT 46.2 12/28/2017    MCV 87.5 12/28/2017     12/28/2017     No results found for: LABA1C  No results found for: EAG  No results found for: LABA1C  No components found for: CHLPL  Lab Results   Component Value Date    TRIG 458 (H) 09/10/2020    TRIG 356 (H) 11/11/2019    TRIG 299 (H) 05/30/2019     Lab Results   Component Value Date    HDL 54 09/10/2020    HDL 66 (H) 11/11/2019    HDL 53 05/30/2019     Lab Results   Component Value Date    LDLCALC see below 09/10/2020    LDLCALC see below 11/11/2019    [address] [facility] [address] 05/30/2019     Lab Results   Component Value Date    LABVLDL see below 09/10/2020    LABVLDL see below 11/11/2019    LABVLDL 60 05/30/2019         Assessment   Plan     1. Current moderate episode of major depressive disorder without prior episode ([facility] [address]  Worse: We will attempt to switch patient to [place]. We will start him at 1.5 mg and depending upon how he is doing we may titrate up at follow-up. Patient was instructed to stop his Effexor after 1 to 2 weeks if he is doing well  - [facility] BEHAV ASSMT W/SCORE & DOCD/STAND INSTRUMENT  - cariprazine hcl (VRAYLAR) 1.5 MG capsule; Take 1 capsule by mouth daily  Dispense: 30 capsule; Refill: 1    2. Hypercholesterolemia  Treating: Continue meds and follow-up in 1 month  - Comprehensive Metabolic Panel; Future  - Icosapent Ethyl (VASCEPA) 1 g CAPS capsule; Take 2 capsules by mouth 2 times daily BIN# [id number]  N# CN  GRP# [id number]  ID# [id number]  Dispense: 120 capsule;  Refill: 5  - ezetimibe (ZETIA) 10 MG tablet; Take 1 tablet by mouth daily  Dispense: 90 tablet; Refill: 3  - atorvastatin (LIPITOR) 80 MG tablet; Take 1 tablet by mouth every evening  Dispense: 90 tablet; Refill: 3    3. Class 1 obesity due to excess calories with serious comorbidity and body mass index (BMI) of 34.0 to 34.9 in adult  Continue to work on diet and exercise. 4. CAD, multiple vessel  Manage modifiable risk factors and follow: Appears stable    5. Essential hypertension  Controlled: Appears stable. We will continue current management and monitor for adverse reaction and disease progression. Follow-up as noted below    - Comprehensive Metabolic Panel; Future  - losartan (COZAAR) 25 MG tablet; Take 1 tablet by mouth daily  Dispense: 90 tablet; Refill: 3    6. Elevated glucose  TERESITA globin A1c was 5.6 today. This is normal  - POCT glycosylated hemoglobin (Hb A1C)    7. Pigmented skin lesion of uncertain nature  We will consult Dr. Shauna Julian for evaluation and removal  - Sola Mar MD Plastic Surgery, [facility]  Discussed use, benefit, and side effects of prescribed medications. Barriers to medication compliance addressed. All patient questions answered. Pt voiced understanding. RTC Return in about 4 weeks (around 4/28/2021).

## 2021-04-06 ENCOUNTER — TELEPHONE (OUTPATIENT)
Dept: PRIMARY CARE CLINIC | Age: 36
End: 2021-04-06

## 2021-04-06 RX ORDER — CARVEDILOL 6.25 MG/1
6.25 TABLET ORAL 2 TIMES DAILY WITH MEALS
Qty: 60 TABLET | Refills: 5 | Status: SHIPPED | OUTPATIENT
Start: 2021-04-06 | End: 2021-04-26

## 2021-04-06 NOTE — TELEPHONE ENCOUNTER
Pharmacy called with question on the carvedilol-  They said it comes with 2 sets of directions they need to clarify please call to clarify

## 2021-04-06 NOTE — TELEPHONE ENCOUNTER
carvedilol (COREG) 6.25 MG tablet    Take 1 tablet by mouth 2 times daily (with meals) Take 1 and a half tablets a day      Pharmacy needs clarification on medication.           [facility] [place] [phone] Sharyle Costa [phone]   [address], [address] [address]   Phone:  [phone]  Fax:  [phone]

## 2021-04-07 NOTE — TELEPHONE ENCOUNTER
PA submitted via [facility] for Vraylar 1.5MG capsules.   (Key: [id number])    STATUS: PENDING

## 2021-04-08 ENCOUNTER — PATIENT MESSAGE (OUTPATIENT)
Dept: PRIMARY CARE CLINIC | Age: 36
End: 2021-04-08

## 2021-04-08 NOTE — TELEPHONE ENCOUNTER
Received DENIAL for Vraylar 1.5MG capsules. Denial letter attached. Please advise patient. Thank you.

## 2021-04-26 RX ORDER — CARVEDILOL 6.25 MG/1
TABLET ORAL
Qty: 60 TABLET | Refills: 5 | Status: SHIPPED | OUTPATIENT
Start: 2021-04-26 | End: 2022-03-03 | Stop reason: SDUPTHER

## 2021-04-28 ENCOUNTER — OFFICE VISIT (OUTPATIENT)
Dept: PRIMARY CARE CLINIC | Age: 36
End: 2021-04-28
Payer: COMMERCIAL

## 2021-04-28 VITALS
DIASTOLIC BLOOD PRESSURE: 86 MMHG | TEMPERATURE: 97.8 F | HEIGHT: 75 IN | SYSTOLIC BLOOD PRESSURE: 138 MMHG | BODY MASS INDEX: 36.61 KG/M2 | WEIGHT: 294.4 LBS | HEART RATE: 98 BPM

## 2021-04-28 DIAGNOSIS — F32.1 CURRENT MODERATE EPISODE OF MAJOR DEPRESSIVE DISORDER WITHOUT PRIOR EPISODE (HCC): Primary | ICD-10-CM

## 2021-04-28 DIAGNOSIS — L81.9 PIGMENTED SKIN LESION OF UNCERTAIN NATURE: ICD-10-CM

## 2021-04-28 PROCEDURE — 99214 OFFICE O/P EST MOD 30 MIN: CPT | Performed by: FAMILY MEDICINE

## 2021-04-28 PROCEDURE — 1036F TOBACCO NON-USER: CPT | Performed by: FAMILY MEDICINE

## 2021-04-28 PROCEDURE — G8417 CALC BMI ABV UP PARAM F/U: HCPCS | Performed by: FAMILY MEDICINE

## 2021-04-28 PROCEDURE — 96127 BRIEF EMOTIONAL/BEHAV ASSMT: CPT | Performed by: FAMILY MEDICINE

## 2021-04-28 PROCEDURE — G8427 DOCREV CUR MEDS BY ELIG CLIN: HCPCS | Performed by: FAMILY MEDICINE

## 2021-04-28 RX ORDER — VENLAFAXINE HYDROCHLORIDE 150 MG/1
150 CAPSULE, EXTENDED RELEASE ORAL DAILY
Qty: 90 CAPSULE | Refills: 3 | Status: SHIPPED | OUTPATIENT
Start: 2021-04-28 | End: 2022-03-03

## 2021-04-28 ASSESSMENT — ENCOUNTER SYMPTOMS
VOMITING: 0
SHORTNESS OF BREATH: 0
NAUSEA: 0
CONSTIPATION: 0
ABDOMINAL PAIN: 0
COUGH: 0
DIARRHEA: 0

## 2021-04-28 ASSESSMENT — PATIENT HEALTH QUESTIONNAIRE - PHQ9
SUM OF ALL RESPONSES TO PHQ QUESTIONS 1-9: 16
SUM OF ALL RESPONSES TO PHQ9 QUESTIONS 1 & 2: 3
5. POOR APPETITE OR OVEREATING: 3
3. TROUBLE FALLING OR STAYING ASLEEP: 3
2. FEELING DOWN, DEPRESSED OR HOPELESS: 2
SUM OF ALL RESPONSES TO PHQ QUESTIONS 1-9: 16
4. FEELING TIRED OR HAVING LITTLE ENERGY: 3
10. IF YOU CHECKED OFF ANY PROBLEMS, HOW DIFFICULT HAVE THESE PROBLEMS MADE IT FOR YOU TO DO YOUR WORK, TAKE CARE OF THINGS AT HOME, OR GET ALONG WITH OTHER PEOPLE: 2
6. FEELING BAD ABOUT YOURSELF - OR THAT YOU ARE A FAILURE OR HAVE LET YOURSELF OR YOUR FAMILY DOWN: 1

## 2021-04-28 NOTE — PATIENT INSTRUCTIONS
Call Dr. Noemi Galeazzi about getting your nose looked at.     [facility], Dr. Blayne Noel MD   [address] [address]   Phone #: [phone]

## 2021-04-28 NOTE — PROGRESS NOTES
PHQ-9 Total Score: 16 (4/28/2021  8:27 AM)  Thoughts that you would be better off dead, or of hurting yourself in some way: 0 (4/28/2021  8:27 AM)

## 2021-04-28 NOTE — ASSESSMENT & PLAN NOTE
Uncontrolled, changes made today: We increased his Effexor from 75 mg to 150 mg daily.   We will follow-up in 4 months sooner if worsening   His Vraylar was denied by insurance and he is clinically worse

## 2021-04-28 NOTE — Clinical Note
Tiff Luque has a 3 mm lesion on R side of his nose that looks like it could be a BCC. Forgive me, I cannot remember if you do Mohs surgery. Regardless, this is something I should send to you?   Thanks, Amarilis Lucero

## 2021-04-28 NOTE — PROGRESS NOTES
Chief Complaint   Patient presents with    Depression       HPI: Edwyna Staff presents for evaluation and management of depression. He notes his mood is worse. He was unable to get his Cherelle Jake as it was denied by his insurance. PHQ Scores 4/28/2021 3/31/2021 9/10/2020 7/28/2020 9/16/2019 8/30/2019 8/5/2019   PHQ2 Score 3 4 2 3 0 1 2   PHQ9 Score 16 12 8 16 0 1 13     Interpretation of Total Score Depression Severity: 1-4 = Minimal depression, 5-9 = Mild depression, 10-14 = Moderate depression, 15-19 = Moderately severe depression, 20-27 = Severe depression    He has yet to schedule an appointment with Dr. Gayatri Sellers for his nasal lesion. He notes no significant change in that today    Review of Systems   Constitutional: Negative for chills and fever. Respiratory: Negative for cough and shortness of breath. Cardiovascular: Negative for chest pain and palpitations. Gastrointestinal: Negative for abdominal pain, constipation, diarrhea, nausea and vomiting. Allergies   Allergen Reactions    Zocor [Simvastatin]      New Prescriptions    No medications on file     Current Outpatient Medications   Medication Sig Dispense Refill    carvedilol (COREG) 6.25 MG tablet TAKE 1.5 TABLETS BY MOUTH TWICE DAILY WITH FOOD 60 tablet 5    Icosapent Ethyl (VASCEPA) 1 g CAPS capsule Take 2 capsules by mouth 2 times daily BIN# [id number]  PCN# CN  [facility]# [id number]  ID# [id number] 120 capsule 5    ezetimibe (ZETIA) 10 MG tablet Take 1 tablet by mouth daily 90 tablet 3    atorvastatin (LIPITOR) 80 MG tablet Take 1 tablet by mouth every evening 90 tablet 3    losartan (COZAAR) 25 MG tablet Take 1 tablet by mouth daily 90 tablet 3    ASPIRIN LOW DOSE 81 MG EC tablet TK 1 T PO D  10     No current facility-administered medications for this visit.         Past Medical History:   Diagnosis Date    Allergic rhinitis     Asthma     CAD (coronary artery disease) 10/11/2018    DEDE x 4 (DAPT x 1 year) Dr. Bryce Dee    Hypercholesterolemia  Urticaria          Objective   /86   Pulse 98   Temp 97.8 °F (36.6 °C) (Temporal)   Ht 6' 3\" (1.905 m)   Wt 294 lb 6.4 oz (133.5 kg)   BMI 36.80 kg/m²   Wt Readings from Last 3 Encounters:   04/28/21 294 lb 6.4 oz (133.5 kg)   03/31/21 285 lb 3.2 oz (129.4 kg)   09/10/20 282 lb 9.6 oz (128.2 kg)       Physical Exam  Constitutional:       Appearance: He is well-developed. Cardiovascular:      Rate and Rhythm: Normal rate and regular rhythm. Heart sounds: No murmur. No friction rub. No gallop. Pulmonary:      Effort: Pulmonary effort is normal.      Breath sounds: Normal breath sounds. No wheezing or rales. Abdominal:      General: Bowel sounds are normal. There is no distension. Palpations: Abdomen is soft. There is no mass. Tenderness: There is no abdominal tenderness. Skin:     General: Skin is warm and dry. Findings: No rash.       Comments: 3 mm pearly papule right sided nose with some microtelangiectasia of capillaries           Chemistry        Component Value Date/Time     03/31/2021 1030    K 4.4 03/31/2021 1030     03/31/2021 1030    CO2 28 03/31/2021 1030    BUN 13 03/31/2021 1030    CREATININE 1.0 03/31/2021 1030        Component Value Date/Time    CALCIUM 9.5 03/31/2021 1030    ALKPHOS 50 03/31/2021 1030    AST 45 (H) 03/31/2021 1030    ALT 63 (H) 03/31/2021 1030    BILITOT 0.5 03/31/2021 1030          Lab Results   Component Value Date    WBC 9.3 12/28/2017    HGB 15.7 12/28/2017    HCT 46.2 12/28/2017    MCV 87.5 12/28/2017     12/28/2017     Lab Results   Component Value Date    LABA1C 5.6 03/31/2021     No results found for: EAG  Lab Results   Component Value Date    LABA1C 5.6 03/31/2021     No components found for: CHLPL  Lab Results   Component Value Date    TRIG 458 (H) 09/10/2020    TRIG 356 (H) 11/11/2019    TRIG 299 (H) 05/30/2019     Lab Results   Component Value Date    HDL 54 09/10/2020    HDL 66 (H) 11/11/2019    HDL 53 05/30/2019 Lab Results   Component Value Date    LDLCALC see below 09/10/2020    [address] see below 11/11/2019    [address] 05/30/2019     Lab Results   Component Value Date    LABVLDL see below 09/10/2020    LABVLDL see below 11/11/2019    LABVLDL 60 05/30/2019         Assessment   Plan   1. Current moderate episode of major depressive disorder without prior episode [facility])  Assessment & Plan:   Uncontrolled, changes made today: We increased his Effexor from 75 mg to 150 mg daily. We will follow-up in 4 months sooner if worsening   His Cherelle Jake was denied by insurance and he is clinically worse  Orders:  -     [place] [facility] ASSMT W/SCORE & DOCD/STAND INSTRUMENT  -     venlafaxine (EFFEXOR XR) 150 MG extended release capsule; Take 1 capsule by mouth daily, Disp-90 capsule, R-3Normal  2. Pigmented skin lesion of uncertain nature  Patient has been referred to Dr. Gayatri Sellers. I am concerned this is a basal cell carcinoma. I encouraged patient to follow-up with plastic surgery in a timely manner. Discussed use, benefit, and side effects of prescribed medications. Barriers to medication compliance addressed. All patient questions answered. Pt voiced understanding. RTC Return in about 5 months (around 9/13/2021).

## 2021-05-28 ENCOUNTER — TELEPHONE (OUTPATIENT)
Dept: ORTHOPEDIC SURGERY | Age: 36
End: 2021-05-28

## 2021-05-28 NOTE — TELEPHONE ENCOUNTER
PA submitted via [facility] for Vascepa 1GM capsules ([facility] uploaded the records I faxed to them)  (Key: [id number])    STATUS: PENDING

## 2021-06-01 NOTE — TELEPHONE ENCOUNTER
Please notify patient. Inform him that we do have coupons that might be able to help make it cheaper.   He can try checking online as well

## 2021-06-01 NOTE — TELEPHONE ENCOUNTER
Patient notified of Denial. Left voicemail and sent MY CHART message with information on the coupons.

## 2021-06-13 NOTE — PROGRESS NOTES
[facility]    CC: Skin lesions    Referring Physician: John Pryor MD    HPI: This is an 39 y.o.male with a PMHx as delineated below who presents to clinic in consultation for a nasal lesion concerning for [address]. The patient noticed the lesion for several years, however notes that it has grown in size. Additionally, the color has changed. He was seen by his PCP who was concerned for malignancy, thus plastic surgery was consulted for evaluation and treatment. Additionally, he has another skin lesion on the lateral aspect of his right nose. PMHx:   Past Medical History:   Diagnosis Date    Allergic rhinitis     Asthma     CAD (coronary artery disease) 10/11/2018    DEDE x 4 (DAPT x 1 year) Dr. Carroll Like    Hypercholesterolemia     Urticaria      PSHx:   Past Surgical History:   Procedure Laterality Date    CARDIAC CATHETERIZATION  10/11/2018    4 Stents placed- Dr Jessica Mccord      Bleeding childhood     Allergy:   Allergies   Allergen Reactions    Zocor [Simvastatin]        SHx:   Social History     Socioeconomic History    Marital status: Single     Spouse name: Roberto Tomas    Number of children: 2    Years of education: Not on file    Highest education level: Not on file   Occupational History    Occupation: [employer]  :    Tobacco Use    Smoking status: Never Smoker    Smokeless tobacco: Never Used   Vaping Use    Vaping Use: Never used   Substance and Sexual Activity    Alcohol use:  Yes     Alcohol/week: 0.0 standard drinks     Comment: Rare    Drug use: No    Sexual activity: Not on file   Other Topics Concern    Not on file   Social History Narrative    Not on file     Social Determinants of Health     Financial Resource Strain:     Difficulty of Paying Living Expenses:    Food Insecurity:     Worried About Running Out of Food in the Last Year:     [address] in the Last Year:    Transportation Needs:     Lack of flank pain, frequency and hematuria. Musculoskeletal: Negative for new gait problem, joint swelling and myalgias. Skin: See HPI. Endocrine: negative for tremors, temperature intolerance or polydipsia. Allergic/Immunologic: Negative for new environmental or food allergies. Neurological: Negative for dizziness, seizures, speech difficulty, numbness. Hematological: Negative for adenopathy. Psychiatric/Behavioral: Negative for agitation and confusion. EXAM    /89   Pulse 100   Ht 6' 3\" (1.905 m)   Wt 292 lb (132.5 kg)   SpO2 98%   BMI 36.50 kg/m²     Constitutional: Patient is oriented to person, place, and time. Vital signs are normal. Patient  appears well-developed and well-nourished. Patient  is active and cooperative. Non-toxic appearance. No distress. Neck: Trachea normal and normal range of motion. Neck supple. No JVD present. No tracheal tenderness present. Carotid bruit is not present. No rigidity. No tracheal deviation and no edema present. No thyromegaly present. Cardiovascular: Normal rate, regular rhythm, normal heart sounds, intact distal pulses and normal pulses. Pulmonary/Chest: Effort normal and breath sounds normal. No stridor. No respiratory distress. Patient  has no wheezes. Patient has no rales. Patient exhibits no tenderness and no crepitus. Abdominal: Soft. Normal appearance and bowel sounds are normal. Patient exhibits no distension, no abdominal bruit, no ascites and no mass. There is no hepatosplenomegaly. There is no tenderness. There is no rigidity, no rebound, no guarding and no CVA tenderness. Musculoskeletal: Normal range of motion. Patient exhibits no edema or tenderness. Neurological: Patient is alert and oriented to person, place, and time. Patient has normal strength. Coordination and gait normal. GCS eye subscore is 4. GCS verbal subscore is 5. GCS motor subscore is 6. Skin: Skin is warm and dry. No abrasion and no rash noted.  Patient  is not diaphoretic. No cyanosis or erythema. Face: 0.5 x 0.4 cm right nasal side wall lesion without evidence of infection  Right lateral nasal-cheek junction lesion (0.4 x 0.2 cm)  Psychiatric: Patient has a normal mood and affect. speech is normal and behavior is normal. Cognition and memory are normal.     PATHOLOGY/WORKUP: None    IMP: 36 y.o.male with nasal lesions. PLAN: Would benefit from excision and closure of his lesions - possible bilobed flap reconstruction under sedation. Will schedule. A discussion regarding surgical options including: excision with tissue transfer was performed with the patient. The pathophysiology of BCC,SCC was also elucidated specifying need for resection, observation, & margins. Clinical photos were obtained. Additionally, discussion regarding the risks including, but not limited to: bleeding (potentially requiring transfusion or reoperation), infection, seroma, reoperation, poor cosmetic outcome, scarring, recurrence,possible facial nerve injury revisional surgery, diminished sensation, VTE (DVT/PE), and death was performed. All questions were answered in a satisfactory manner. The patient was counseled at length about the risks of julee Covid-19 during their perioperative period and any recovery window from their procedure. The patient was made aware that julee Covid-19  may worsen their prognosis for recovering from their procedure  and lend to a higher morbidity and/or mortality risk. All material risks, benefits, and reasonable alternatives including postponing the procedure were discussed. The patient does wish to proceed with the procedure at this time.     Greg Everett MD  [address] [place] [address] Reconstructive Surgery  [phone]  06/13/21

## 2021-06-14 ENCOUNTER — TELEPHONE (OUTPATIENT)
Dept: SURGERY | Age: 36
End: 2021-06-14

## 2021-06-14 ENCOUNTER — OFFICE VISIT (OUTPATIENT)
Dept: SURGERY | Age: 36
End: 2021-06-14
Payer: COMMERCIAL

## 2021-06-14 VITALS
DIASTOLIC BLOOD PRESSURE: 89 MMHG | WEIGHT: 292 LBS | SYSTOLIC BLOOD PRESSURE: 136 MMHG | HEART RATE: 100 BPM | BODY MASS INDEX: 36.31 KG/M2 | OXYGEN SATURATION: 98 % | HEIGHT: 75 IN

## 2021-06-14 DIAGNOSIS — D48.5 NEOPLASM OF UNCERTAIN BEHAVIOR OF SKIN: Primary | ICD-10-CM

## 2021-06-14 PROCEDURE — G8427 DOCREV CUR MEDS BY ELIG CLIN: HCPCS | Performed by: SURGERY

## 2021-06-14 PROCEDURE — G8417 CALC BMI ABV UP PARAM F/U: HCPCS | Performed by: SURGERY

## 2021-06-14 PROCEDURE — 99204 OFFICE O/P NEW MOD 45 MIN: CPT | Performed by: SURGERY

## 2021-06-14 PROCEDURE — 1036F TOBACCO NON-USER: CPT | Performed by: SURGERY

## 2021-06-14 NOTE — TELEPHONE ENCOUNTER
The patient was in the office to see Dr. Dante Baker today. IMP: 36 y.o.male with nasal lesions. PLAN: Would benefit from excision and closure of his lesions - possible bilobed flap reconstruction under sedation. Will schedule. I will work on pre certification and then call the patient to schedule.     I will leave this phone note open.

## 2021-06-14 NOTE — LETTER
Surgery Schedule Request Form  [facility] [address] [facility]  The patient received his COVID vaccines 3- and 4- at [address] DATE OF SURGERY: 7-  TIME OF SURGERY: 12:15 pm     Confirmation#:__________________        Surgeon Name: Rita Gabriel MD    Phone: [phone]     Fax: [phone]  Procedure Name:  1) Excision of nasal and cheek lesions     2) Possible adjacent tissue transfer  CPT CODES (required for scheduling): 22 717760, 95469   DIAGNOSIS: D48.5  Nasal and cheek lesions    LENGTH OF PROCEDURE: 1 hour  Patient Status: Outpatient    Labs Needed:   CBC __  PT/PTT___ INR __  CMP ___ EKG ___   Urine Hcg ___            PATIENT NAME: Anitra Zimmerman            YOB: 1985  SEX: male   SS #:   PHONE: [phone] (home)     Pre-Op to be done by: PCP  Cardiac Clearance Done by: per PCP    Pt Position:  supine  Patient to meet with Anesthesiology prior to surgery: no     Medications to be stopped 5 days before surgery: anticoagulation     **Ancef 2 gm IV OCTOR (NOTE:  If patient weight is > 120 kg, Administer 3 gm)  Other Orders: eyestretcher;    INSURANCE: [facility]                                               SUBSCRIBER NAME: Self    MEMBER ID: [id number]                                            AUTHORIZATION #: The codes are valid and billable. The codes may require manual review, but do not require prior authorization. The codes do not require pre authorization when being completed at an outpatient surgery center. PCP: Saeid Epstein MD                                        ANESTHESIA:  LINCOLN Gabriel MD                             FAX TO: [phone]   QUESTIONS?  CALL: [phone]

## 2021-06-15 NOTE — TELEPHONE ENCOUNTER
I spoke with Jing Little at [facility] [phone] to see if CPT Code 22 378996 or 77134 require pre certification. The codes are valid and billable. The codes may require manual review, but do not require prior authorization. The codes do not require pre authorization when being completed at an outpatient surgery center. Call Reference # [id number]    I spoke with the patient at the home number listed. The patient is now scheduled for surgery with  at [facility] on 7-. The patient is aware of H&P. The patient is aware to stop taking his ASPIRIN 3 days prior to surgery per Kayla. The patient received his COVID vaccines 3- and 4- at [address] [place] [address]. The patient is scheduled for his post op appointment 7-. I will fax the surgery letter to [facility] today. I will scan the letter and the fax success into Epic under the media tab. I will mail the surgery information and instructions to the patient today. I will close this phone note.

## 2021-06-28 ENCOUNTER — OFFICE VISIT (OUTPATIENT)
Dept: PRIMARY CARE CLINIC | Age: 36
End: 2021-06-28
Payer: COMMERCIAL

## 2021-06-28 VITALS
WEIGHT: 297 LBS | BODY MASS INDEX: 37.12 KG/M2 | HEART RATE: 88 BPM | SYSTOLIC BLOOD PRESSURE: 136 MMHG | DIASTOLIC BLOOD PRESSURE: 82 MMHG

## 2021-06-28 DIAGNOSIS — J45.20 MILD INTERMITTENT ASTHMA WITHOUT COMPLICATION: ICD-10-CM

## 2021-06-28 DIAGNOSIS — I25.10 CAD, MULTIPLE VESSEL: ICD-10-CM

## 2021-06-28 DIAGNOSIS — I10 ESSENTIAL HYPERTENSION: ICD-10-CM

## 2021-06-28 DIAGNOSIS — L81.9 PIGMENTED SKIN LESION OF UNCERTAIN NATURE: Primary | ICD-10-CM

## 2021-06-28 DIAGNOSIS — Z01.818 PRE-OP EXAM: ICD-10-CM

## 2021-06-28 LAB
A/G RATIO: 2 (ref 1.1–2.2)
ALBUMIN SERPL-MCNC: 4.7 G/DL (ref 3.4–5)
ALP BLD-CCNC: 55 U/L (ref 40–129)
ALT SERPL-CCNC: 82 U/L (ref 10–40)
ANION GAP SERPL CALCULATED.3IONS-SCNC: 12 MMOL/L (ref 3–16)
APTT: 28.3 SEC (ref 24.2–36.2)
AST SERPL-CCNC: 49 U/L (ref 15–37)
BASOPHILS ABSOLUTE: 0.1 K/UL (ref 0–0.2)
BASOPHILS RELATIVE PERCENT: 0.6 %
BILIRUB SERPL-MCNC: 0.4 MG/DL (ref 0–1)
BUN BLDV-MCNC: 11 MG/DL (ref 7–20)
CALCIUM SERPL-MCNC: 9.5 MG/DL (ref 8.3–10.6)
CHLORIDE BLD-SCNC: 99 MMOL/L (ref 99–110)
CO2: 25 MMOL/L (ref 21–32)
CREAT SERPL-MCNC: 0.8 MG/DL (ref 0.9–1.3)
EOSINOPHILS ABSOLUTE: 0.3 K/UL (ref 0–0.6)
EOSINOPHILS RELATIVE PERCENT: 3 %
GFR AFRICAN AMERICAN: >60
GFR NON-AFRICAN AMERICAN: >60
GLOBULIN: 2.3 G/DL
GLUCOSE BLD-MCNC: 66 MG/DL (ref 70–99)
HCT VFR BLD CALC: 44.1 % (ref 40.5–52.5)
HEMOGLOBIN: 14.8 G/DL (ref 13.5–17.5)
INR BLD: 1.01 (ref 0.86–1.14)
LYMPHOCYTES ABSOLUTE: 3.9 K/UL (ref 1–5.1)
LYMPHOCYTES RELATIVE PERCENT: 38.4 %
MCH RBC QN AUTO: 30 PG (ref 26–34)
MCHC RBC AUTO-ENTMCNC: 33.6 G/DL (ref 31–36)
MCV RBC AUTO: 89.3 FL (ref 80–100)
MONOCYTES ABSOLUTE: 0.9 K/UL (ref 0–1.3)
MONOCYTES RELATIVE PERCENT: 8.5 %
NEUTROPHILS ABSOLUTE: 5.1 K/UL (ref 1.7–7.7)
NEUTROPHILS RELATIVE PERCENT: 49.5 %
PDW BLD-RTO: 14.7 % (ref 12.4–15.4)
PLATELET # BLD: 264 K/UL (ref 135–450)
PMV BLD AUTO: 8.3 FL (ref 5–10.5)
POTASSIUM SERPL-SCNC: 4.1 MMOL/L (ref 3.5–5.1)
PROTHROMBIN TIME: 11.7 SEC (ref 10–13.2)
RBC # BLD: 4.94 M/UL (ref 4.2–5.9)
SODIUM BLD-SCNC: 136 MMOL/L (ref 136–145)
TOTAL PROTEIN: 7 G/DL (ref 6.4–8.2)
WBC # BLD: 10.3 K/UL (ref 4–11)

## 2021-06-28 PROCEDURE — G8417 CALC BMI ABV UP PARAM F/U: HCPCS | Performed by: FAMILY MEDICINE

## 2021-06-28 PROCEDURE — 93000 ELECTROCARDIOGRAM COMPLETE: CPT | Performed by: FAMILY MEDICINE

## 2021-06-28 PROCEDURE — G8427 DOCREV CUR MEDS BY ELIG CLIN: HCPCS | Performed by: FAMILY MEDICINE

## 2021-06-28 PROCEDURE — 99213 OFFICE O/P EST LOW 20 MIN: CPT | Performed by: FAMILY MEDICINE

## 2021-06-28 ASSESSMENT — ENCOUNTER SYMPTOMS
SHORTNESS OF BREATH: 0
DIARRHEA: 0
RHINORRHEA: 0
SORE THROAT: 0
NAUSEA: 0
VOMITING: 0
ABDOMINAL PAIN: 0
CONSTIPATION: 0
COUGH: 1
COLOR CHANGE: 0
EYE PAIN: 0

## 2021-06-28 NOTE — PROGRESS NOTES
Chief Complaint   Patient presents with    Pre-op Exam       HPI: Glenny Barrientos presents for evaluation and management of preoperative exam for skin lesions on his nose at the request of Dr. Chi Delgado where he is going to have surgery in mid July at [facility]. He notes he is taking and tolerating his cardiovascular medications. He has no exertional dyspnea or chest pain. He had stents put in 3 years ago and keeps follow-up with his cardiologist.    He is taking and tolerating his blood pressure medicine without lightheadedness or dizziness. He has a history of mild intermittent asthma that has been well controlled. He has not needed rescue inhaler and notes no shortness of breath or wheezing. He does note slight cough productive of scant sputum. Review of Systems   Constitutional: Negative for chills and fever. HENT: Negative for ear pain, rhinorrhea and sore throat. Eyes: Negative for pain and visual disturbance. Respiratory: Positive for cough. Negative for shortness of breath. Cardiovascular: Negative for chest pain and palpitations. Gastrointestinal: Negative for abdominal pain, constipation, diarrhea, nausea and vomiting. Genitourinary: Negative for dysuria and frequency. Musculoskeletal: Negative for joint swelling and myalgias. Skin: Negative for color change and rash. Neurological: Negative for weakness, numbness and headaches. Hematological: Negative for adenopathy. Does not bruise/bleed easily. Psychiatric/Behavioral: Negative for dysphoric mood, self-injury and suicidal ideas. The patient is not nervous/anxious.         Allergies   Allergen Reactions    Zocor [Simvastatin]      New Prescriptions    No medications on file     Current Outpatient Medications   Medication Sig Dispense Refill    venlafaxine (EFFEXOR XR) 150 MG extended release capsule Take 1 capsule by mouth daily 90 capsule 3    carvedilol (COREG) 6.25 MG tablet TAKE 1.5 TABLETS BY MOUTH TWICE DAILY WITH FOOD 60 tablet 5    Icosapent Ethyl (VASCEPA) 1 g CAPS capsule Take 2 capsules by mouth 2 times daily BIN# [id number]  N# CN  [facility]# [id number]  ID# [id number] 120 capsule 5    ezetimibe (ZETIA) 10 MG tablet Take 1 tablet by mouth daily 90 tablet 3    atorvastatin (LIPITOR) 80 MG tablet Take 1 tablet by mouth every evening 90 tablet 3    losartan (COZAAR) 25 MG tablet Take 1 tablet by mouth daily 90 tablet 3    ASPIRIN LOW DOSE 81 MG EC tablet TK 1 T PO D  10     No current facility-administered medications for this visit. Past Medical History:   Diagnosis Date    Allergic rhinitis     Asthma     CAD (coronary artery disease) 10/11/2018    DEDE x 4 (DAPT x 1 year) Dr. Jose M Hitchcock Hypercholesterolemia     Urticaria          Objective   /82   Pulse 88   Wt 297 lb (134.7 kg)   BMI 37.12 kg/m²   Wt Readings from Last 3 Encounters:   06/28/21 297 lb (134.7 kg)   06/14/21 292 lb (132.5 kg)   04/28/21 294 lb 6.4 oz (133.5 kg)       Physical Exam  Constitutional:       Appearance: He is well-developed. HENT:      Head: Normocephalic and atraumatic. Nose: Nose normal.      Mouth/Throat:      Pharynx: No oropharyngeal exudate. Eyes:      General: No scleral icterus. Right eye: No discharge. Left eye: No discharge. Pupils: Pupils are equal, round, and reactive to light. Neck:      Thyroid: No thyromegaly. Cardiovascular:      Rate and Rhythm: Normal rate and regular rhythm. Pulses:           Dorsalis pedis pulses are 2+ on the right side and 2+ on the left side. Posterior tibial pulses are 2+ on the right side and 2+ on the left side. Heart sounds: Normal heart sounds. No murmur heard. No friction rub. No gallop. Comments: No Edema Lower Extremities  Pulmonary:      Effort: Pulmonary effort is normal.      Breath sounds: Normal breath sounds. No wheezing or rales. Abdominal:      General: Bowel sounds are normal. There is no distension. Palpations: Abdomen is soft. There is no hepatomegaly or splenomegaly. Tenderness: There is no abdominal tenderness. There is no guarding or rebound. Musculoskeletal:         General: No tenderness or deformity. Normal range of motion. Cervical back: Normal range of motion and neck supple. Lymphadenopathy:      Cervical: No cervical adenopathy. Skin:     General: Skin is warm and dry. Findings: No erythema or rash. Neurological:      Mental Status: He is alert. Cranial Nerves: No cranial nerve deficit. Sensory: No sensory deficit. Gait: Gait normal.   Psychiatric:         Speech: Speech normal.         Behavior: Behavior normal.           Chemistry        Component Value Date/Time     03/31/2021 1030    K 4.4 03/31/2021 1030     03/31/2021 1030    CO2 28 03/31/2021 1030    BUN 13 03/31/2021 1030    CREATININE 1.0 03/31/2021 1030        Component Value Date/Time    CALCIUM 9.5 03/31/2021 1030    ALKPHOS 50 03/31/2021 1030    AST 45 (H) 03/31/2021 1030    ALT 63 (H) 03/31/2021 1030    BILITOT 0.5 03/31/2021 1030          Lab Results   Component Value Date    WBC 9.3 12/28/2017    HGB 15.7 12/28/2017    HCT 46.2 12/28/2017    MCV 87.5 12/28/2017     12/28/2017     Lab Results   Component Value Date    LABA1C 5.6 03/31/2021     No results found for: EAG  Lab Results   Component Value Date    LABA1C 5.6 03/31/2021     No components found for: CHLPL  Lab Results   Component Value Date    TRIG 458 (H) 09/10/2020    TRIG 356 (H) 11/11/2019    TRIG 299 (H) 05/30/2019     Lab Results   Component Value Date    HDL 54 09/10/2020    HDL 66 (H) 11/11/2019    HDL 53 05/30/2019     Lab Results   Component Value Date    LDLCALC see below 09/10/2020    LDLCALC see below 11/11/2019    [address] 05/30/2019     Lab Results   Component Value Date    LABVLDL see below 09/10/2020    LABVLDL see below 11/11/2019    LABVLDL 60 05/30/2019         Assessment   Plan   1.  Pigmented skin lesion of uncertain nature  2. CAD, multiple vessel  Assessment & Plan:   Well-controlled, continue current medications appears stable and low risk for cardiovascular complications. Managing modifiable risk factors. Orders:  -     Comprehensive Metabolic Panel; Future  -     CBC Auto Differential; Future  -     APTT; Future  -     Protime-INR; Future  3. Essential hypertension  Assessment & Plan:   Well-controlled, continue current medications , check labs, and recheck 3 months. 4. Mild intermittent asthma without complication  Assessment & Plan:   Well-controlled, continue current medications and remains well controlled, monitor. 5. Pre-op exam  -     EKG 12 Lead   Discussed use, benefit, and side effects of prescribed medications. Barriers to medication compliance addressed. All patient questions answered. Pt voiced understanding. RTC Return in about 3 months (around 9/28/2021).

## 2021-06-28 NOTE — ASSESSMENT & PLAN NOTE
Well-controlled, continue current medications appears stable and low risk for cardiovascular complications. Managing modifiable risk factors.

## 2021-07-08 NOTE — PROGRESS NOTES
Brito Hof    Age 39 y.o.    male    1985    MRN [id number]    7/16/2021  Arrival Time_____________  OR Time____________60 Min     Procedure(s):  EXCISION OF NASAL AND CHEEK LESIONS, POSSIBLE ADJACENT TISSUE TRANSFER                      Monitor Anesthesia Care    Surgeon(s):  Gabriel Khoury, MD       Phone [phone] (Addis)     [address]  Cell         Work  _____________________________________________________________________  _____________________________________________________________________  _____________________________________________________________________  _____________________________________________________________________  _____________________________________________________________________    PCP _____________________________ Phone_________________     H&P__________________Bringing      Chart            Epic   DOS      Called________  EKG__________________Bringing      Chart            Epic   DOS      Called________  LAB__________________ Bringing      Chart            Epic   DOS      Called________  Cardiac Clearance_______Bringing      Chart            Epic      DOS      Called________    Cardiologist________________________ Phone___________________________    ? Amish concerns / Waiver on Chart            PAT Communications________________  ? Pre-op Instructions Given [place]          _________________________________  ? Directions to Surgery Center                          _________________________________  ? Transportation Home_______________      __________________________________  ?  Crutches/Walker__________________        __________________________________    ________Pre-op Orders   _______Transcribed    _______Wt.  ________Pharmacy          _______SCD  ______VTE     ______Beta Blocker  ________Consent             ________TED HOSE    COVID DATE______________LOCATION________________ RESULT__________     2ND VACCINE DATE_____________

## 2021-07-11 ENCOUNTER — ANESTHESIA EVENT (OUTPATIENT)
Dept: OPERATING ROOM | Age: 36
End: 2021-07-11
Payer: COMMERCIAL

## 2021-07-12 ENCOUNTER — TELEPHONE (OUTPATIENT)
Dept: SURGERY | Age: 36
End: 2021-07-12

## 2021-07-12 NOTE — TELEPHONE ENCOUNTER
I returned the call to [facility] mentioned below. I lmom with the following information :    6- 9:14 am    I spoke with Ervin Valencia at [facility] [phone] to see if CPT Code 22 280441 or 61185 require pre certification. The codes are valid and billable. The codes may require manual review, but do not require prior authorization. The codes do not require pre authorization when being completed at an outpatient surgery center. I requested a call back with any additional questions or concerns. I will close this phone note.

## 2021-07-13 NOTE — PROGRESS NOTES
Obstructive Sleep Apnea (MAGY) Screening     Patient:  Miguel Angel Rogers    YOB: 1985      Medical Record #:  [id number]                     Date:  7/13/2021     1. Are you a loud and/or regular snorer? [x]  Yes       [] No    2. Have you been observed to gasp or stop breathing during sleep? []  Yes       [x] No    3. Do you feel tired or groggy upon awakening or do you awaken with a headache?           []  Yes       [x] No    4. Are you often tired or fatigued during the wake time hours? []  Yes       [x] No    5. Do you fall asleep sitting, reading, watching TV or driving? []  Yes       [x] No    6. Do you often have problems with memory or concentration? []  Yes       [x] No    **If patient's score is ? 3 they are considered high risk for MAGY. An Anesthesia provider will evaluate the patient and develop a plan of care the day of surgery. Note:  If the patient's BMI is more than 35 kg m¯² , has neck circumference > 40 cm, and/or high blood pressure the risk is greater (© American Sleep Apnea Association, 2006).

## 2021-07-16 ENCOUNTER — HOSPITAL ENCOUNTER (OUTPATIENT)
Age: 36
Setting detail: OUTPATIENT SURGERY
Discharge: HOME | End: 2021-07-16
Attending: SURGERY | Admitting: SURGERY
Payer: COMMERCIAL

## 2021-07-16 ENCOUNTER — ANESTHESIA (OUTPATIENT)
Dept: OPERATING ROOM | Age: 36
End: 2021-07-16
Payer: COMMERCIAL

## 2021-07-16 VITALS
HEART RATE: 96 BPM | OXYGEN SATURATION: 94 % | DIASTOLIC BLOOD PRESSURE: 99 MMHG | RESPIRATION RATE: 12 BRPM | HEIGHT: 75 IN | TEMPERATURE: 98.2 F | WEIGHT: 290 LBS | SYSTOLIC BLOOD PRESSURE: 145 MMHG | BODY MASS INDEX: 36.06 KG/M2

## 2021-07-16 VITALS
OXYGEN SATURATION: 98 % | DIASTOLIC BLOOD PRESSURE: 85 MMHG | RESPIRATION RATE: 8 BRPM | SYSTOLIC BLOOD PRESSURE: 164 MMHG

## 2021-07-16 DIAGNOSIS — D48.5 NEOPLASM OF UNCERTAIN BEHAVIOR OF SKIN: ICD-10-CM

## 2021-07-16 PROCEDURE — 6370000000 HC RX 637 (ALT 250 FOR IP): Performed by: ANESTHESIOLOGY

## 2021-07-16 PROCEDURE — 6360000002 HC RX W HCPCS: Performed by: SURGERY

## 2021-07-16 PROCEDURE — 2500000003 HC RX 250 WO HCPCS: Performed by: SURGERY

## 2021-07-16 PROCEDURE — 7100000000 HC PACU RECOVERY - FIRST 15 MIN: Performed by: SURGERY

## 2021-07-16 PROCEDURE — 3600000003 HC SURGERY LEVEL 3 BASE: Performed by: SURGERY

## 2021-07-16 PROCEDURE — 3700000000 HC ANESTHESIA ATTENDED CARE: Performed by: SURGERY

## 2021-07-16 PROCEDURE — 11441 EXC FACE-MM B9+MARG 0.6-1 CM: CPT | Performed by: SURGERY

## 2021-07-16 PROCEDURE — 2709999900 HC NON-CHARGEABLE SUPPLY: Performed by: SURGERY

## 2021-07-16 PROCEDURE — 13151 CMPLX RPR E/N/E/L 1.1-2.5 CM: CPT | Performed by: SURGERY

## 2021-07-16 PROCEDURE — 3600000013 HC SURGERY LEVEL 3 ADDTL 15MIN: Performed by: SURGERY

## 2021-07-16 PROCEDURE — 7100000010 HC PHASE II RECOVERY - FIRST 15 MIN: Performed by: SURGERY

## 2021-07-16 PROCEDURE — 14060 TIS TRNFR E/N/E/L 10 SQ CM/<: CPT | Performed by: SURGERY

## 2021-07-16 PROCEDURE — 2580000003 HC RX 258: Performed by: ANESTHESIOLOGY

## 2021-07-16 PROCEDURE — 3700000001 HC ADD 15 MINUTES (ANESTHESIA): Performed by: SURGERY

## 2021-07-16 PROCEDURE — 6360000002 HC RX W HCPCS: Performed by: NURSE ANESTHETIST, CERTIFIED REGISTERED

## 2021-07-16 PROCEDURE — 88305 TISSUE EXAM BY PATHOLOGIST: CPT

## 2021-07-16 PROCEDURE — 2500000003 HC RX 250 WO HCPCS: Performed by: NURSE ANESTHETIST, CERTIFIED REGISTERED

## 2021-07-16 PROCEDURE — 7100000011 HC PHASE II RECOVERY - ADDTL 15 MIN: Performed by: SURGERY

## 2021-07-16 PROCEDURE — 2580000003 HC RX 258: Performed by: SURGERY

## 2021-07-16 RX ORDER — DEXAMETHASONE SODIUM PHOSPHATE 4 MG/ML
INJECTION, SOLUTION INTRA-ARTICULAR; INTRALESIONAL; INTRAMUSCULAR; INTRAVENOUS; SOFT TISSUE PRN
Status: DISCONTINUED | OUTPATIENT
Start: 2021-07-16 | End: 2021-07-16 | Stop reason: SDUPTHER

## 2021-07-16 RX ORDER — SODIUM CHLORIDE 0.9 % (FLUSH) 0.9 %
10 SYRINGE (ML) INJECTION EVERY 12 HOURS SCHEDULED
Status: DISCONTINUED | OUTPATIENT
Start: 2021-07-16 | End: 2021-07-16 | Stop reason: HOSPADM

## 2021-07-16 RX ORDER — OXYCODONE HYDROCHLORIDE AND ACETAMINOPHEN 5; 325 MG/1; MG/1
1 TABLET ORAL PRN
Status: DISCONTINUED | OUTPATIENT
Start: 2021-07-16 | End: 2021-07-16 | Stop reason: HOSPADM

## 2021-07-16 RX ORDER — SODIUM CHLORIDE 9 MG/ML
25 INJECTION, SOLUTION INTRAVENOUS PRN
Status: DISCONTINUED | OUTPATIENT
Start: 2021-07-16 | End: 2021-07-16 | Stop reason: HOSPADM

## 2021-07-16 RX ORDER — PROMETHAZINE HYDROCHLORIDE 25 MG/ML
6.25 INJECTION, SOLUTION INTRAMUSCULAR; INTRAVENOUS
Status: DISCONTINUED | OUTPATIENT
Start: 2021-07-16 | End: 2021-07-16 | Stop reason: HOSPADM

## 2021-07-16 RX ORDER — MORPHINE SULFATE 2 MG/ML
2 INJECTION, SOLUTION INTRAMUSCULAR; INTRAVENOUS EVERY 5 MIN PRN
Status: DISCONTINUED | OUTPATIENT
Start: 2021-07-16 | End: 2021-07-16 | Stop reason: HOSPADM

## 2021-07-16 RX ORDER — LIDOCAINE HYDROCHLORIDE AND EPINEPHRINE 10; 10 MG/ML; UG/ML
INJECTION, SOLUTION INFILTRATION; PERINEURAL PRN
Status: DISCONTINUED | OUTPATIENT
Start: 2021-07-16 | End: 2021-07-16 | Stop reason: ALTCHOICE

## 2021-07-16 RX ORDER — SODIUM CHLORIDE, SODIUM LACTATE, POTASSIUM CHLORIDE, CALCIUM CHLORIDE 600; 310; 30; 20 MG/100ML; MG/100ML; MG/100ML; MG/100ML
INJECTION, SOLUTION INTRAVENOUS CONTINUOUS
Status: DISCONTINUED | OUTPATIENT
Start: 2021-07-16 | End: 2021-07-16 | Stop reason: HOSPADM

## 2021-07-16 RX ORDER — PROPOFOL 10 MG/ML
INJECTION, EMULSION INTRAVENOUS PRN
Status: DISCONTINUED | OUTPATIENT
Start: 2021-07-16 | End: 2021-07-16 | Stop reason: SDUPTHER

## 2021-07-16 RX ORDER — MEPERIDINE HYDROCHLORIDE 50 MG/ML
12.5 INJECTION INTRAMUSCULAR; INTRAVENOUS; SUBCUTANEOUS EVERY 5 MIN PRN
Status: DISCONTINUED | OUTPATIENT
Start: 2021-07-16 | End: 2021-07-16 | Stop reason: HOSPADM

## 2021-07-16 RX ORDER — HYDRALAZINE HYDROCHLORIDE 20 MG/ML
5 INJECTION INTRAMUSCULAR; INTRAVENOUS EVERY 10 MIN PRN
Status: DISCONTINUED | OUTPATIENT
Start: 2021-07-16 | End: 2021-07-16 | Stop reason: HOSPADM

## 2021-07-16 RX ORDER — MORPHINE SULFATE 2 MG/ML
1 INJECTION, SOLUTION INTRAMUSCULAR; INTRAVENOUS EVERY 5 MIN PRN
Status: DISCONTINUED | OUTPATIENT
Start: 2021-07-16 | End: 2021-07-16 | Stop reason: HOSPADM

## 2021-07-16 RX ORDER — OXYCODONE HYDROCHLORIDE AND ACETAMINOPHEN 5; 325 MG/1; MG/1
2 TABLET ORAL PRN
Status: DISCONTINUED | OUTPATIENT
Start: 2021-07-16 | End: 2021-07-16 | Stop reason: HOSPADM

## 2021-07-16 RX ORDER — LIDOCAINE HYDROCHLORIDE 10 MG/ML
1 INJECTION, SOLUTION EPIDURAL; INFILTRATION; INTRACAUDAL; PERINEURAL
Status: DISCONTINUED | OUTPATIENT
Start: 2021-07-16 | End: 2021-07-16 | Stop reason: HOSPADM

## 2021-07-16 RX ORDER — ONDANSETRON 2 MG/ML
4 INJECTION INTRAMUSCULAR; INTRAVENOUS PRN
Status: DISCONTINUED | OUTPATIENT
Start: 2021-07-16 | End: 2021-07-16 | Stop reason: HOSPADM

## 2021-07-16 RX ORDER — SODIUM CHLORIDE 0.9 % (FLUSH) 0.9 %
10 SYRINGE (ML) INJECTION PRN
Status: DISCONTINUED | OUTPATIENT
Start: 2021-07-16 | End: 2021-07-16 | Stop reason: HOSPADM

## 2021-07-16 RX ORDER — LIDOCAINE HYDROCHLORIDE 20 MG/ML
INJECTION, SOLUTION INFILTRATION; PERINEURAL PRN
Status: DISCONTINUED | OUTPATIENT
Start: 2021-07-16 | End: 2021-07-16 | Stop reason: SDUPTHER

## 2021-07-16 RX ORDER — FENTANYL CITRATE 50 UG/ML
INJECTION, SOLUTION INTRAMUSCULAR; INTRAVENOUS PRN
Status: DISCONTINUED | OUTPATIENT
Start: 2021-07-16 | End: 2021-07-16 | Stop reason: SDUPTHER

## 2021-07-16 RX ORDER — MAGNESIUM HYDROXIDE 1200 MG/15ML
LIQUID ORAL CONTINUOUS PRN
Status: COMPLETED | OUTPATIENT
Start: 2021-07-16 | End: 2021-07-16

## 2021-07-16 RX ORDER — LABETALOL HYDROCHLORIDE 5 MG/ML
5 INJECTION, SOLUTION INTRAVENOUS EVERY 10 MIN PRN
Status: DISCONTINUED | OUTPATIENT
Start: 2021-07-16 | End: 2021-07-16 | Stop reason: HOSPADM

## 2021-07-16 RX ORDER — DIPHENHYDRAMINE HYDROCHLORIDE 50 MG/ML
12.5 INJECTION INTRAMUSCULAR; INTRAVENOUS
Status: DISCONTINUED | OUTPATIENT
Start: 2021-07-16 | End: 2021-07-16 | Stop reason: HOSPADM

## 2021-07-16 RX ORDER — ONDANSETRON 2 MG/ML
INJECTION INTRAMUSCULAR; INTRAVENOUS PRN
Status: DISCONTINUED | OUTPATIENT
Start: 2021-07-16 | End: 2021-07-16 | Stop reason: SDUPTHER

## 2021-07-16 RX ADMIN — LIDOCAINE HYDROCHLORIDE 100 MG: 20 INJECTION, SOLUTION INFILTRATION; PERINEURAL at 13:08

## 2021-07-16 RX ADMIN — DEXAMETHASONE SODIUM PHOSPHATE 10 MG: 4 INJECTION, SOLUTION INTRAMUSCULAR; INTRAVENOUS at 13:16

## 2021-07-16 RX ADMIN — SODIUM CHLORIDE, SODIUM LACTATE, POTASSIUM CHLORIDE, AND CALCIUM CHLORIDE: .6; .31; .03; .02 INJECTION, SOLUTION INTRAVENOUS at 13:05

## 2021-07-16 RX ADMIN — PROPOFOL 600 MG: 10 INJECTION, EMULSION INTRAVENOUS at 13:09

## 2021-07-16 RX ADMIN — Medication 3000 MG: at 13:04

## 2021-07-16 RX ADMIN — FENTANYL CITRATE 100 MCG: 50 INJECTION INTRAMUSCULAR; INTRAVENOUS at 13:20

## 2021-07-16 RX ADMIN — CARVEDILOL 9.38 MG: 3.12 TABLET, FILM COATED ORAL at 11:47

## 2021-07-16 RX ADMIN — ONDANSETRON 4 MG: 2 INJECTION INTRAMUSCULAR; INTRAVENOUS at 13:16

## 2021-07-16 ASSESSMENT — PULMONARY FUNCTION TESTS
PIF_VALUE: 20
PIF_VALUE: 12
PIF_VALUE: 1
PIF_VALUE: 21
PIF_VALUE: 18
PIF_VALUE: 0
PIF_VALUE: 1
PIF_VALUE: 16
PIF_VALUE: 5
PIF_VALUE: 11
PIF_VALUE: 19
PIF_VALUE: 16
PIF_VALUE: 20
PIF_VALUE: 18
PIF_VALUE: 12
PIF_VALUE: 16
PIF_VALUE: 17
PIF_VALUE: 22
PIF_VALUE: 24
PIF_VALUE: 16
PIF_VALUE: 30
PIF_VALUE: 31
PIF_VALUE: 25
PIF_VALUE: 16
PIF_VALUE: 16
PIF_VALUE: 21
PIF_VALUE: 0
PIF_VALUE: 27
PIF_VALUE: 28
PIF_VALUE: 1
PIF_VALUE: 18
PIF_VALUE: 21
PIF_VALUE: 1
PIF_VALUE: 30
PIF_VALUE: 2
PIF_VALUE: 17
PIF_VALUE: 5
PIF_VALUE: 25
PIF_VALUE: 16
PIF_VALUE: 31
PIF_VALUE: 8
PIF_VALUE: 20
PIF_VALUE: 21
PIF_VALUE: 20
PIF_VALUE: 20
PIF_VALUE: 7
PIF_VALUE: 0

## 2021-07-16 ASSESSMENT — PAIN SCALES - GENERAL
PAINLEVEL_OUTOF10: 0

## 2021-07-16 ASSESSMENT — PAIN - FUNCTIONAL ASSESSMENT: PAIN_FUNCTIONAL_ASSESSMENT: 0-10

## 2021-07-16 NOTE — ANESTHESIA POSTPROCEDURE EVALUATION
Department of Anesthesiology  Postprocedure Note    Patient: Grazyna Trejo  MRN: [id number]  YOB: 1985  Date of evaluation: 7/16/2021  Time:  2:15 PM     Procedure Summary     Date: 07/16/21 Room / Location: [address] [place] [address]    Anesthesia Start: 7071 Anesthesia Stop: 0691    Procedure: EXCISION OF NASAL AND CHEEK LESIONS,  ADJACENT TISSUE TRANSFER (N/A Face) Diagnosis:       Neoplasm of uncertain behavior of skin      (NASAL AND CHEEK LESIONS)    Surgeons: Paris Crowder MD Responsible Provider: oJyce Rosario MD    Anesthesia Type: MAC ASA Status: 3          Anesthesia Type: MAC    Keven Phase I: Keven Score: 10    Keven Phase II: Keven Score: 10    Last vitals: Reviewed and per EMR flowsheets.        Anesthesia Post Evaluation    Comments: Postoperative Anesthesia Note    Name:    Grazyna Trejo  MRN:      [id number]    Patient Vitals in the past 12 hrs:  07/16/21 1410, BP:(!) 136/97, Temp:98.2 °F (36.8 °C), Pulse:95, Resp:14, SpO2:95 %  07/16/21 1405, BP:(!) 149/92, Pulse:100, Resp:12, SpO2:94 %  07/16/21 1400, BP:(!) 162/103, Temp:98.6 °F (37 °C), Pulse:99, Resp:14, SpO2:92 %  07/16/21 1354, BP:(!) 161/96, Temp:98.7 °F (37.1 °C), Temp src:Temporal, Pulse:100, Resp:12, SpO2:94 %  07/16/21 1113, BP:(!) 145/87, Temp:97.2 °F (36.2 °C), Temp src:Temporal, Pulse:84, Resp:18, SpO2:97 %, Height:6' 3\" (1.905 m), Weight:290 lb (131.5 kg)     LABS:    CBC  Lab Results       Component                Value               Date/Time                  WBC                      10.3                06/28/2021 11:33 AM        HGB                      14.8                06/28/2021 11:33 AM        HCT                      44.1                06/28/2021 11:33 AM        PLT                      264                 06/28/2021 11:33 AM   RENAL  Lab Results       Component                Value               Date/Time                  NA                       136                 06/28/2021

## 2021-07-16 NOTE — H&P
Update History & Physical    The patient's History and Physical of June 28, 2021 was reviewed with the patient and I examined the patient. There was no change . The surgical site was confirmed by the patient and me. Plan: The risks, benefits, expected outcome, and alternative to the recommended procedure have been discussed with the patient. Patient understands and wants to proceed with the procedure.      Electronically signed by Chance Cantor MD on 7/16/2021 at 1:10 PM

## 2021-07-16 NOTE — OP NOTE
[facility] PLASTIC & RECONSTRUCTIVE SURGERY     OPERATIVE DICTATION    NAME: Pepe Youssef   MRN: [id number]  DATE: 7/16/2021    AGE: 39 y.o. LOCATION: [facility]    PREOPERATIVE DIAGNOSIS:  Nasal lesions     POSTOPERATIVE DIAGNOSIS:  Same. OPERATION PERFORMED: 1) Excision of nasal lesions (dorsum: 0.7 x 0.4 cm; nasolabial: 1 x 0.5 cm)      2) Complex closure (1 cm)      3) Rhomboid flap closure (1 x 0.7 cm flap = total: 0.98 cm^2)     SURGEON:  Kari Green MD     ANESTHESIA: Sedation     ESTIMATED BLOOD LOSS:  Minimal     DRAINS:  None     SPECIMENS: Nasal lesions     OPERATIVE INDICATIONS:  This is a 39 y.o. male who presented to the office in consultation with multiple growing nasal lesions concerning for maligancy. Given these areas, he was referred by his PCP for excision. A thorough discussion regarding the risks, benefits, alternatives, outcomes, and personnel involved was performed with the patient. After all questions were answered to the patient's satisfaction, they agreed to proceed. OPERATIVE PROCEDURE:  The patient was marked in the preoperative holding area and then brought to the operating room on the eye stretcher. The patient was prepped and draped in the usual sterile manner. A time-out was performed confirming the patient and the procedure to be performed. The operation commenced by infiltration of local using a 50:50 mixture of 1% lidocaine with epinephrine and 0.5% marcaine plain. An excision was then performed removing the lesion circumferentially in an elliptical manner. The lesions were sent to pathology after being removed using a 15 blade. .  Hemostasis was obtained with electrocautery. The nasolabial wound was then asymmetrically undermined to allow for cheek mobilization so that the ala was not altered. This was then closed using 5-0 Chromic sutures. Attention was directed to the dorsum.  While primary closure would be feasible, the nostril was elevated, thus a backcut was performed in a rhomboid manner to allow for tissue mobilization without affecting the nostril height. The flap was sutured into position using 5-0 Prolene sutures. The nostril symmetry was excellent upon completion. A sterile dressing was then applied. The patient was awakened and taken to the PACU in stable condition. There were no immediate complications and the patient tolerated the procedure well. At the end of the case, all counts were correct.     Judie Mina MD
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
## 2021-07-16 NOTE — PROGRESS NOTES
Wife updated on phone. Discharge instructions reviewed with patient and responsible adult. Discharge instructions signed and copy given with no additional questions. Patient to be discharged home with belongings.

## 2021-07-19 ENCOUNTER — TELEPHONE (OUTPATIENT)
Dept: PRIMARY CARE CLINIC | Age: 36
End: 2021-07-19

## 2021-07-19 ENCOUNTER — TELEPHONE (OUTPATIENT)
Dept: SURGERY | Age: 36
End: 2021-07-19

## 2021-07-19 NOTE — TELEPHONE ENCOUNTER
[facility] Transitions Initial Follow Up Call    Outreach made within 2 business days of discharge: Yes    Patient: Sharifa Mustafa Patient : 1985   MRN: <[id number]>  Reason for Admission: There are no discharge diagnoses documented for the most recent discharge. Discharge Date: 21       Spoke with: Surgeons office reached out to patient with follow up.   Appointment scheduled     Discharge department/facility: [facility]    Scheduled appointment with PCP within 7-14 days    Follow Up  Future Appointments   Date Time Provider Eris Neumann   2021 12:00 PM Louisa Brown MD PLASTICS/REC [facility]   2021  8:15 AM Supriya Sousa MD [id number] [place] [address], [address] [place] [address]

## 2022-03-03 ENCOUNTER — OFFICE VISIT (OUTPATIENT)
Dept: PRIMARY CARE CLINIC | Age: 37
End: 2022-03-03
Payer: COMMERCIAL

## 2022-03-03 VITALS
SYSTOLIC BLOOD PRESSURE: 146 MMHG | WEIGHT: 292.2 LBS | HEART RATE: 91 BPM | DIASTOLIC BLOOD PRESSURE: 86 MMHG | TEMPERATURE: 98 F | BODY MASS INDEX: 36.52 KG/M2

## 2022-03-03 DIAGNOSIS — M25.512 LEFT SHOULDER PAIN, UNSPECIFIED CHRONICITY: ICD-10-CM

## 2022-03-03 DIAGNOSIS — I10 ESSENTIAL HYPERTENSION: ICD-10-CM

## 2022-03-03 DIAGNOSIS — E78.00 HYPERCHOLESTEROLEMIA: ICD-10-CM

## 2022-03-03 DIAGNOSIS — F32.1 CURRENT MODERATE EPISODE OF MAJOR DEPRESSIVE DISORDER WITHOUT PRIOR EPISODE (HCC): ICD-10-CM

## 2022-03-03 DIAGNOSIS — I25.10 CAD, MULTIPLE VESSEL: Primary | ICD-10-CM

## 2022-03-03 PROCEDURE — 99214 OFFICE O/P EST MOD 30 MIN: CPT | Performed by: FAMILY MEDICINE

## 2022-03-03 PROCEDURE — 96127 BRIEF EMOTIONAL/BEHAV ASSMT: CPT | Performed by: FAMILY MEDICINE

## 2022-03-03 PROCEDURE — G8427 DOCREV CUR MEDS BY ELIG CLIN: HCPCS | Performed by: FAMILY MEDICINE

## 2022-03-03 PROCEDURE — G8484 FLU IMMUNIZE NO ADMIN: HCPCS | Performed by: FAMILY MEDICINE

## 2022-03-03 PROCEDURE — G8417 CALC BMI ABV UP PARAM F/U: HCPCS | Performed by: FAMILY MEDICINE

## 2022-03-03 PROCEDURE — 1036F TOBACCO NON-USER: CPT | Performed by: FAMILY MEDICINE

## 2022-03-03 RX ORDER — LOSARTAN POTASSIUM 25 MG/1
25 TABLET ORAL DAILY
Qty: 90 TABLET | Refills: 3 | Status: SHIPPED | OUTPATIENT
Start: 2022-03-03

## 2022-03-03 RX ORDER — ASPIRIN 81 MG/1
TABLET, COATED ORAL
Qty: 30 TABLET | Refills: 10 | Status: SHIPPED | OUTPATIENT
Start: 2022-03-03

## 2022-03-03 RX ORDER — EZETIMIBE 10 MG/1
10 TABLET ORAL DAILY
Qty: 90 TABLET | Refills: 3 | Status: SHIPPED | OUTPATIENT
Start: 2022-03-03

## 2022-03-03 RX ORDER — ATORVASTATIN CALCIUM 80 MG/1
80 TABLET, FILM COATED ORAL EVERY EVENING
Qty: 90 TABLET | Refills: 3 | Status: SHIPPED | OUTPATIENT
Start: 2022-03-03

## 2022-03-03 RX ORDER — CARVEDILOL 6.25 MG/1
TABLET ORAL
Qty: 60 TABLET | Refills: 11 | Status: SHIPPED | OUTPATIENT
Start: 2022-03-03

## 2022-03-03 ASSESSMENT — PATIENT HEALTH QUESTIONNAIRE - PHQ9
10. IF YOU CHECKED OFF ANY PROBLEMS, HOW DIFFICULT HAVE THESE PROBLEMS MADE IT FOR YOU TO DO YOUR WORK, TAKE CARE OF THINGS AT HOME, OR GET ALONG WITH OTHER PEOPLE: 0
3. TROUBLE FALLING OR STAYING ASLEEP: 0
SUM OF ALL RESPONSES TO PHQ QUESTIONS 1-9: 0
2. FEELING DOWN, DEPRESSED OR HOPELESS: 0
5. POOR APPETITE OR OVEREATING: 0
SUM OF ALL RESPONSES TO PHQ QUESTIONS 1-9: 0
SUM OF ALL RESPONSES TO PHQ QUESTIONS 1-9: 0
6. FEELING BAD ABOUT YOURSELF - OR THAT YOU ARE A FAILURE OR HAVE LET YOURSELF OR YOUR FAMILY DOWN: 0
SUM OF ALL RESPONSES TO PHQ QUESTIONS 1-9: 0
4. FEELING TIRED OR HAVING LITTLE ENERGY: 0
7. TROUBLE CONCENTRATING ON THINGS, SUCH AS READING THE NEWSPAPER OR WATCHING TELEVISION: 0
8. MOVING OR SPEAKING SO SLOWLY THAT OTHER PEOPLE COULD HAVE NOTICED. OR THE OPPOSITE, BEING SO FIGETY OR RESTLESS THAT YOU HAVE BEEN MOVING AROUND A LOT MORE THAN USUAL: 0

## 2022-03-03 NOTE — PATIENT INSTRUCTIONS
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
## 2022-07-19 ENCOUNTER — OFFICE VISIT (OUTPATIENT)
Dept: PRIMARY CARE CLINIC | Age: 37
End: 2022-07-19
Payer: COMMERCIAL

## 2022-07-19 VITALS
OXYGEN SATURATION: 97 % | BODY MASS INDEX: 37.3 KG/M2 | HEART RATE: 60 BPM | DIASTOLIC BLOOD PRESSURE: 61 MMHG | SYSTOLIC BLOOD PRESSURE: 139 MMHG | TEMPERATURE: 97.7 F | WEIGHT: 300 LBS | HEIGHT: 75 IN

## 2022-07-19 DIAGNOSIS — I25.10 CAD, MULTIPLE VESSEL: Primary | ICD-10-CM

## 2022-07-19 DIAGNOSIS — E66.01 CLASS 2 SEVERE OBESITY DUE TO EXCESS CALORIES WITH SERIOUS COMORBIDITY AND BODY MASS INDEX (BMI) OF 37.0 TO 37.9 IN ADULT (HCC): ICD-10-CM

## 2022-07-19 DIAGNOSIS — F32.1 CURRENT MODERATE EPISODE OF MAJOR DEPRESSIVE DISORDER WITHOUT PRIOR EPISODE (HCC): ICD-10-CM

## 2022-07-19 DIAGNOSIS — E78.00 HYPERCHOLESTEROLEMIA: ICD-10-CM

## 2022-07-19 DIAGNOSIS — I10 ESSENTIAL HYPERTENSION: ICD-10-CM

## 2022-07-19 PROCEDURE — 99214 OFFICE O/P EST MOD 30 MIN: CPT | Performed by: FAMILY MEDICINE

## 2022-07-19 RX ORDER — VENLAFAXINE HYDROCHLORIDE 75 MG/1
75 CAPSULE, EXTENDED RELEASE ORAL DAILY
Qty: 30 CAPSULE | Refills: 3 | Status: SHIPPED | OUTPATIENT
Start: 2022-07-19 | End: 2022-08-22

## 2022-07-19 SDOH — ECONOMIC STABILITY: FOOD INSECURITY: WITHIN THE PAST 12 MONTHS, YOU WORRIED THAT YOUR FOOD WOULD RUN OUT BEFORE YOU GOT MONEY TO BUY MORE.: NEVER TRUE

## 2022-07-19 SDOH — ECONOMIC STABILITY: FOOD INSECURITY: WITHIN THE PAST 12 MONTHS, THE FOOD YOU BOUGHT JUST DIDN'T LAST AND YOU DIDN'T HAVE MONEY TO GET MORE.: NEVER TRUE

## 2022-07-19 ASSESSMENT — PATIENT HEALTH QUESTIONNAIRE - PHQ9
3. TROUBLE FALLING OR STAYING ASLEEP: 3
6. FEELING BAD ABOUT YOURSELF - OR THAT YOU ARE A FAILURE OR HAVE LET YOURSELF OR YOUR FAMILY DOWN: 0
SUM OF ALL RESPONSES TO PHQ QUESTIONS 1-9: 9
9. THOUGHTS THAT YOU WOULD BE BETTER OFF DEAD, OR OF HURTING YOURSELF: 0
4. FEELING TIRED OR HAVING LITTLE ENERGY: 3
5. POOR APPETITE OR OVEREATING: 0
1. LITTLE INTEREST OR PLEASURE IN DOING THINGS: 0
2. FEELING DOWN, DEPRESSED OR HOPELESS: 0
7. TROUBLE CONCENTRATING ON THINGS, SUCH AS READING THE NEWSPAPER OR WATCHING TELEVISION: 3
8. MOVING OR SPEAKING SO SLOWLY THAT OTHER PEOPLE COULD HAVE NOTICED. OR THE OPPOSITE, BEING SO FIGETY OR RESTLESS THAT YOU HAVE BEEN MOVING AROUND A LOT MORE THAN USUAL: 0
SUM OF ALL RESPONSES TO PHQ QUESTIONS 1-9: 9
10. IF YOU CHECKED OFF ANY PROBLEMS, HOW DIFFICULT HAVE THESE PROBLEMS MADE IT FOR YOU TO DO YOUR WORK, TAKE CARE OF THINGS AT HOME, OR GET ALONG WITH OTHER PEOPLE: SOMEWHAT DIFFICULT
SUM OF ALL RESPONSES TO PHQ QUESTIONS 1-9: 9
SUM OF ALL RESPONSES TO PHQ9 QUESTIONS 1 & 2: 0
SUM OF ALL RESPONSES TO PHQ QUESTIONS 1-9: 9

## 2022-07-19 ASSESSMENT — SOCIAL DETERMINANTS OF HEALTH (SDOH): HOW HARD IS IT FOR YOU TO PAY FOR THE VERY BASICS LIKE FOOD, HOUSING, MEDICAL CARE, AND HEATING?: NOT HARD AT ALL

## 2022-07-19 ASSESSMENT — PATIENT HEALTH QUESTIONNAIRE - GENERAL
HAS THERE BEEN A TIME IN THE PAST MONTH WHEN YOU HAVE HAD SERIOUS THOUGHTS ABOUT ENDING YOUR LIFE?: NO
IN THE PAST YEAR HAVE YOU FELT DEPRESSED OR SAD MOST DAYS, EVEN IF YOU FELT OKAY SOMETIMES?: NO
HAVE YOU EVER, IN YOUR WHOLE LIFE, TRIED TO KILL YOURSELF OR MADE A SUICIDE ATTEMPT?: NO

## 2022-07-19 NOTE — PROGRESS NOTES
Chief Complaint   Patient presents with    Follow-up       HPI: Carl Sahni  presents for evaluation and management of recurrent non-ST elevated MI. Carl Sahni notes that on July 12 he experienced significant dyspnea and chest pain after climbing 17 steps at work. Noted his heart rate was racing as well, checked his pulse it was 140. Consequently he went to the emergency room and was found to have had another NSTEMI. He admits he had stopped his medicine. Thought he could manage it on his own. He has restarted his medications and is feeling physically okay today. He does admit that his depression and anxiety are worse. He stopped all of his meds and he got another job promotion and is feeling overwhelmed again. Today's PHQ:    PHQ Scores 7/19/2022 3/3/2022 4/28/2021 3/31/2021 9/10/2020 7/28/2020 9/16/2019   PHQ2 Score 0 - 3 4 2 3 0   PHQ9 Score 9 0 16 12 8 16 0     Interpretation of Total Score Depression Severity: 1-4 = Minimal depression, 5-9 = Mild depression, 10-14 = Moderate depression, 15-19 = Moderately severe depression, 20-27 = Severe depression        Review of Systems    Allergies   Allergen Reactions    Zocor [Simvastatin]      New Prescriptions    VENLAFAXINE (EFFEXOR XR) 75 MG EXTENDED RELEASE CAPSULE    Take 1 capsule by mouth in the morning. Current Outpatient Medications   Medication Sig Dispense Refill    venlafaxine (EFFEXOR XR) 75 MG extended release capsule Take 1 capsule by mouth in the morning. 30 capsule 3    carvedilol (COREG) 6.25 MG tablet TAKE 1.5 TABLETS BY MOUTH TWICE DAILY WITH FOOD 60 tablet 11    ezetimibe (ZETIA) 10 MG tablet Take 1 tablet by mouth daily 90 tablet 3    atorvastatin (LIPITOR) 80 MG tablet Take 1 tablet by mouth every evening 90 tablet 3    losartan (COZAAR) 25 MG tablet Take 1 tablet by mouth daily 90 tablet 3    ASPIRIN LOW DOSE 81 MG EC tablet TK 1 T PO D 30 tablet 10     No current facility-administered medications for this visit.        Past Medical History:   Diagnosis Date    Allergic rhinitis     Asthma     CAD (coronary artery disease) 10/11/2018    DEDE x 4 (DAPT x 1 year) Dr. Zac Rivas    Hypercholesterolemia     Urticaria          Objective   /61   Pulse 60   Temp 97.7 °F (36.5 °C)   Ht 6' 3\" (1.905 m)   Wt 300 lb (136.1 kg)   SpO2 97%   BMI 37.50 kg/m²   Wt Readings from Last 3 Encounters:   07/19/22 300 lb (136.1 kg)   03/03/22 292 lb 3.2 oz (132.5 kg)   07/16/21 290 lb (131.5 kg)       Physical Exam  Constitutional:       Appearance: He is well-developed. Cardiovascular:      Rate and Rhythm: Normal rate and regular rhythm. Occasional Extrasystoles are present. Heart sounds: No murmur heard. No friction rub. No gallop. Pulmonary:      Effort: Pulmonary effort is normal.      Breath sounds: Normal breath sounds. No wheezing or rales. Abdominal:      General: Bowel sounds are normal. There is no distension. Palpations: Abdomen is soft. There is no mass. Tenderness: There is no abdominal tenderness. Skin:     General: Skin is warm and dry. Findings: No rash.          Chemistry        Component Value Date/Time     06/28/2021 1133    K 4.1 06/28/2021 1133    CL 99 06/28/2021 1133    CO2 25 06/28/2021 1133    BUN 11 06/28/2021 1133    CREATININE 0.8 (L) 06/28/2021 1133        Component Value Date/Time    CALCIUM 9.5 06/28/2021 1133    ALKPHOS 55 06/28/2021 1133    AST 49 (H) 06/28/2021 1133    ALT 82 (H) 06/28/2021 1133    BILITOT 0.4 06/28/2021 1133          Lab Results   Component Value Date    WBC 10.3 06/28/2021    HGB 14.8 06/28/2021    HCT 44.1 06/28/2021    MCV 89.3 06/28/2021     06/28/2021     Lab Results   Component Value Date    LABA1C 5.6 03/31/2021     No results found for: EAG  Lab Results   Component Value Date    LABA1C 5.6 03/31/2021     No components found for: CHLPL  Lab Results   Component Value Date    TRIG 458 (H) 09/10/2020    TRIG 356 (H) 11/11/2019    TRIG 299 (H) 05/30/2019     Lab Results   Component Value Date    HDL 54 09/10/2020    HDL 66 (H) 11/11/2019    HDL 53 05/30/2019     Lab Results   Component Value Date    LDLCALC see below 09/10/2020    [address] see below 11/11/2019    [address] 05/30/2019     Lab Results   Component Value Date    LABVLDL see below 09/10/2020    LABVLDL see below 11/11/2019    LABVLDL 60 05/30/2019         Assessment   Plan   1. CAD, multiple vessel  I counseled patient on importance of compliance with medication for secondary prevention. He states he understands this and will remain compliant with his cardiac meds. Encouraged him to keep his follow-up with Dr. Nat Ji as well    2. Essential hypertension  Controlled: Appears stable. We will continue current management and monitor for adverse reaction and disease progression. Follow-up as noted below      3. Hypercholesterolemia  Controlled: Appears stable. We will continue current management and monitor for adverse reaction and disease progression. Follow-up as noted below      4. Current moderate episode of major depressive disorder without prior episode ([address]  Worse: We will restart his Effexor and follow-up in 1 month. If symptoms are persisting or he continues to struggle with focus he may need referral to psychiatry for evaluation for ADHD  - venlafaxine (EFFEXOR XR) 75 MG extended release capsule; Take 1 capsule by mouth in the morning. Dispense: 30 capsule; Refill: 3    5. Class 2 severe obesity due to excess calories with serious comorbidity and body mass index (BMI) of 37.0 to 37.9 in adult [facility])  Counseled patient to check his insurance to see if it covers GLP-1 medication such as SPHRBF. Referred to weight loss center [facility]        RTC Return in about 1 month (around 8/19/2022).

## 2022-07-19 NOTE — PATIENT INSTRUCTIONS
Check to see if your insurance covers [facility] ROMMEL or other GLP-1 weight loss drugs.     Dr. Kenna Castro  [facility]  [address] [place] [address]  Phone: [phone]  Fax: [phone]

## 2022-08-22 ENCOUNTER — OFFICE VISIT (OUTPATIENT)
Dept: PRIMARY CARE CLINIC | Age: 37
End: 2022-08-22
Payer: COMMERCIAL

## 2022-08-22 VITALS
HEIGHT: 75 IN | HEART RATE: 96 BPM | TEMPERATURE: 98.2 F | BODY MASS INDEX: 37.18 KG/M2 | WEIGHT: 299 LBS | OXYGEN SATURATION: 96 % | DIASTOLIC BLOOD PRESSURE: 66 MMHG | SYSTOLIC BLOOD PRESSURE: 126 MMHG

## 2022-08-22 DIAGNOSIS — E66.01 CLASS 2 SEVERE OBESITY DUE TO EXCESS CALORIES WITH SERIOUS COMORBIDITY AND BODY MASS INDEX (BMI) OF 37.0 TO 37.9 IN ADULT (HCC): ICD-10-CM

## 2022-08-22 DIAGNOSIS — I25.10 CAD, MULTIPLE VESSEL: ICD-10-CM

## 2022-08-22 DIAGNOSIS — F32.1 CURRENT MODERATE EPISODE OF MAJOR DEPRESSIVE DISORDER WITHOUT PRIOR EPISODE (HCC): ICD-10-CM

## 2022-08-22 DIAGNOSIS — I25.10 CAD, MULTIPLE VESSEL: Primary | ICD-10-CM

## 2022-08-22 LAB
A/G RATIO: 1.7 (ref 1.1–2.2)
ALBUMIN SERPL-MCNC: 4.3 G/DL (ref 3.4–5)
ALP BLD-CCNC: 73 U/L (ref 40–129)
ALT SERPL-CCNC: 50 U/L (ref 10–40)
ANION GAP SERPL CALCULATED.3IONS-SCNC: 13 MMOL/L (ref 3–16)
AST SERPL-CCNC: 26 U/L (ref 15–37)
BILIRUB SERPL-MCNC: <0.2 MG/DL (ref 0–1)
BUN BLDV-MCNC: 10 MG/DL (ref 7–20)
CALCIUM SERPL-MCNC: 9.4 MG/DL (ref 8.3–10.6)
CHLORIDE BLD-SCNC: 103 MMOL/L (ref 99–110)
CHOLESTEROL, TOTAL: 229 MG/DL (ref 0–199)
CO2: 26 MMOL/L (ref 21–32)
CREAT SERPL-MCNC: 0.9 MG/DL (ref 0.9–1.3)
GFR AFRICAN AMERICAN: >60
GFR NON-AFRICAN AMERICAN: >60
GLUCOSE BLD-MCNC: 128 MG/DL (ref 70–99)
HDLC SERPL-MCNC: 51 MG/DL (ref 40–60)
LDL CHOLESTEROL CALCULATED: ABNORMAL MG/DL
LDL CHOLESTEROL DIRECT: 97 MG/DL
POTASSIUM SERPL-SCNC: 4.4 MMOL/L (ref 3.5–5.1)
SODIUM BLD-SCNC: 142 MMOL/L (ref 136–145)
TOTAL PROTEIN: 6.8 G/DL (ref 6.4–8.2)
TRIGL SERPL-MCNC: 614 MG/DL (ref 0–150)
VLDLC SERPL CALC-MCNC: ABNORMAL MG/DL

## 2022-08-22 PROCEDURE — 99214 OFFICE O/P EST MOD 30 MIN: CPT | Performed by: FAMILY MEDICINE

## 2022-08-22 PROCEDURE — 96127 BRIEF EMOTIONAL/BEHAV ASSMT: CPT | Performed by: FAMILY MEDICINE

## 2022-08-22 RX ORDER — VENLAFAXINE HYDROCHLORIDE 150 MG/1
150 CAPSULE, EXTENDED RELEASE ORAL DAILY
Qty: 90 CAPSULE | Refills: 3 | Status: SHIPPED | OUTPATIENT
Start: 2022-08-22

## 2022-08-22 ASSESSMENT — PATIENT HEALTH QUESTIONNAIRE - PHQ9
5. POOR APPETITE OR OVEREATING: 0
SUM OF ALL RESPONSES TO PHQ QUESTIONS 1-9: 0
3. TROUBLE FALLING OR STAYING ASLEEP: 0
8. MOVING OR SPEAKING SO SLOWLY THAT OTHER PEOPLE COULD HAVE NOTICED. OR THE OPPOSITE, BEING SO FIGETY OR RESTLESS THAT YOU HAVE BEEN MOVING AROUND A LOT MORE THAN USUAL: 0
2. FEELING DOWN, DEPRESSED OR HOPELESS: 0
SUM OF ALL RESPONSES TO PHQ9 QUESTIONS 1 & 2: 0
4. FEELING TIRED OR HAVING LITTLE ENERGY: 0
SUM OF ALL RESPONSES TO PHQ QUESTIONS 1-9: 0
1. LITTLE INTEREST OR PLEASURE IN DOING THINGS: 0
SUM OF ALL RESPONSES TO PHQ QUESTIONS 1-9: 0
10. IF YOU CHECKED OFF ANY PROBLEMS, HOW DIFFICULT HAVE THESE PROBLEMS MADE IT FOR YOU TO DO YOUR WORK, TAKE CARE OF THINGS AT HOME, OR GET ALONG WITH OTHER PEOPLE: NOT DIFFICULT AT ALL
SUM OF ALL RESPONSES TO PHQ QUESTIONS 1-9: 0
6. FEELING BAD ABOUT YOURSELF - OR THAT YOU ARE A FAILURE OR HAVE LET YOURSELF OR YOUR FAMILY DOWN: 0
9. THOUGHTS THAT YOU WOULD BE BETTER OFF DEAD, OR OF HURTING YOURSELF: 0
7. TROUBLE CONCENTRATING ON THINGS, SUCH AS READING THE NEWSPAPER OR WATCHING TELEVISION: 0

## 2022-08-22 ASSESSMENT — PATIENT HEALTH QUESTIONNAIRE - GENERAL
IN THE PAST YEAR HAVE YOU FELT DEPRESSED OR SAD MOST DAYS, EVEN IF YOU FELT OKAY SOMETIMES?: NO
HAS THERE BEEN A TIME IN THE PAST MONTH WHEN YOU HAVE HAD SERIOUS THOUGHTS ABOUT ENDING YOUR LIFE?: NO
HAVE YOU EVER, IN YOUR WHOLE LIFE, TRIED TO KILL YOURSELF OR MADE A SUICIDE ATTEMPT?: NO

## 2022-08-22 NOTE — PROGRESS NOTES
Chief Complaint   Patient presents with    Coronary Artery Disease    Depression    Obesity              HPI: Frances Thomas  presents for evaluation and management of coronary artery disease, depression, and obesity. Frances Thomas notes that physically he feels pretty good. Notes no chest pain or palpitations or exertional intolerance. He notes he checked into his insurance and they do not cover GLP-1 medications. He is not scheduled with [facility] weight management clinic either. He thought he had lost a little bit of weight by working on diet and exercise since her last visit. Reports his mood is significantly improved from last visit he likes the Effexor but is still having difficulty with motivation. He notes he feels very anxious and struggles with focus and concentration and attention to tasks at work. He wonders if he may have ADHD.     Today's PHQ:    PHQ Scores 8/22/2022 7/19/2022 3/3/2022 4/28/2021 3/31/2021 9/10/2020 7/28/2020   PHQ2 Score 0 0 - 3 4 2 3   PHQ9 Score 0 9 0 16 12 8 16     Interpretation of Total Score Depression Severity: 1-4 = Minimal depression, 5-9 = Mild depression, 10-14 = Moderate depression, 15-19 = Moderately severe depression, 20-27 = Severe depression        Review of Systems    Allergies   Allergen Reactions    Zocor [Simvastatin]      New Prescriptions    VENLAFAXINE (EFFEXOR XR) 150 MG EXTENDED RELEASE CAPSULE    Take 1 capsule by mouth daily     Current Outpatient Medications   Medication Sig Dispense Refill    venlafaxine (EFFEXOR XR) 150 MG extended release capsule Take 1 capsule by mouth daily 90 capsule 3    carvedilol (COREG) 6.25 MG tablet TAKE 1.5 TABLETS BY MOUTH TWICE DAILY WITH FOOD 60 tablet 11    ezetimibe (ZETIA) 10 MG tablet Take 1 tablet by mouth daily 90 tablet 3    atorvastatin (LIPITOR) 80 MG tablet Take 1 tablet by mouth every evening 90 tablet 3    losartan (COZAAR) 25 MG tablet Take 1 tablet by mouth daily 90 tablet 3    ASPIRIN LOW DOSE 81 MG EC tablet TK 1 T PO D 30 tablet 10     No current facility-administered medications for this visit. Past Medical History:   Diagnosis Date    Allergic rhinitis     Asthma     CAD (coronary artery disease) 10/11/2018    DEDE x 4 (DAPT x 1 year) Dr. Brett Quiroga    Hypercholesterolemia     Urticaria          Objective   /66   Pulse 96   Temp 98.2 °F (36.8 °C)   Ht 6' 3\" (1.905 m)   Wt 299 lb (135.6 kg)   SpO2 96%   BMI 37.37 kg/m²   Wt Readings from Last 3 Encounters:   08/22/22 299 lb (135.6 kg)   07/19/22 300 lb (136.1 kg)   03/03/22 292 lb 3.2 oz (132.5 kg)       Physical Exam  Constitutional:       Appearance: He is well-developed. Cardiovascular:      Rate and Rhythm: Normal rate and regular rhythm. Heart sounds: No murmur heard. No friction rub. No gallop. Pulmonary:      Effort: Pulmonary effort is normal.      Breath sounds: Normal breath sounds. No wheezing or rales. Abdominal:      General: Bowel sounds are normal. There is no distension. Palpations: Abdomen is soft. There is no mass. Tenderness: There is no abdominal tenderness. Skin:     General: Skin is warm and dry. Findings: No rash. Psychiatric:         Mood and Affect: Mood normal.         Behavior: Behavior normal.         Thought Content:  Thought content normal.         Chemistry        Component Value Date/Time     06/28/2021 1133    K 4.1 06/28/2021 1133    CL 99 06/28/2021 1133    CO2 25 06/28/2021 1133    BUN 11 06/28/2021 1133    CREATININE 0.8 (L) 06/28/2021 1133        Component Value Date/Time    CALCIUM 9.5 06/28/2021 1133    ALKPHOS 55 06/28/2021 1133    AST 49 (H) 06/28/2021 1133    ALT 82 (H) 06/28/2021 1133    BILITOT 0.4 06/28/2021 1133          Lab Results   Component Value Date    WBC 10.3 06/28/2021    HGB 14.8 06/28/2021    HCT 44.1 06/28/2021    MCV 89.3 06/28/2021     06/28/2021     Lab Results   Component Value Date    LABA1C 5.6 03/31/2021     No results found for: EAG  Lab Results Component Value Date    LABA1C 5.6 03/31/2021     No components found for: CHLPL  Lab Results   Component Value Date    TRIG 458 (H) 09/10/2020    TRIG 356 (H) 11/11/2019    TRIG 299 (H) 05/30/2019     Lab Results   Component Value Date    HDL 54 09/10/2020    HDL 66 (H) 11/11/2019    HDL 53 05/30/2019     Lab Results   Component Value Date    LDLCALC see below 09/10/2020    LDLCALC see below 11/11/2019    [address] 05/30/2019     Lab Results   Component Value Date    LABVLDL see below 09/10/2020    LABVLDL see below 11/11/2019    LABVLDL 60 05/30/2019         Assessment   Plan   1. CAD, multiple vessel  Controlled: Appears stable. We will continue current management and monitor for adverse reaction and disease progression. Follow-up as noted below    - Lipid Panel; Future  - Comprehensive Metabolic Panel; Future    2. Current moderate episode of major depressive disorder without prior episode ([address]  Improved but still struggling with symptoms of anxiety and motivation and possibly may have some comorbid ADHD. We will titrate up his Effexor to 150 mg daily and consult Dr. Beatrice Lockett for assistance and evaluation for underlying comorbid psychiatric disease  - [place] BEHAV ASSMT W/SCORE & DOCD/STAND INSTRUMENT  - venlafaxine (EFFEXOR XR) 150 MG extended release capsule; Take 1 capsule by mouth daily  Dispense: 90 capsule; Refill: 3  - Ambulatory referral to Psychiatry    3. Class 2 severe obesity due to excess calories with serious comorbidity and body mass index (BMI) of 37.0 to 37.9 in adult [facility])  Counseled patient on diet and exercise. Asked him to schedule follow-up with the [facility] weight loss clinic and we will follow-up  3 months        RTC Return in about 3 months (around 11/22/2022).

## 2022-08-23 NOTE — RESULT ENCOUNTER NOTE
Good Morning Manuel Pendleton ,    Thanks for coming in again. .. Your lab results are your cholesterol looks okay. Triglycerides are still elevated but your liver enzymes are down so I think you are making progress with your diet. Keep up the good work!     Have a great week,    Dr. Sherice Ramos      For your convenience I have listed your future appointment(s) below:  Future Appointments  11/22/2022 3:00 PM    Zahraa Aburto MD        [address] [facility]  2/3/2023   10:30 AM   Sun Jackson MD        [facility]        [facility]

## 2023-01-30 NOTE — PROGRESS NOTES
PSYCHIATRY INITIAL EVALUATION    Angela Zimmerman  1985  2/3/23  Face to Face time: 60 minutes  PCP: Bibiana Zepeda MD    CC: New Patient, Anxiety, and Psychiatric Evaluation      ASSESSMENT:   Patient is a 45 y.o. male with significant PMH of hypercholesterolemia, CAD, HTN, hyperlipidemia, hx of NSTEMI, and asthma who presents to the outpatient psychiatry offices today for evaluation of ADHD, anxiety, and depression. Patient's presentation today appears consistent with a diagnosis of ADHD, inatttentive subtype being predominant. The patient's symptoms, which extend all the way back into childhood, do appear to be causing significant dysfunction at present and would normally merit medication management. With the patient's cardiac history, we will need to obtain clearance from his cardiologist that he is eligible for stimulating medications prior to their consideration. Diagnosis:  ADHD, inattentive subtype  Unspecified depressive disorder  Unspecified anxiety disorder    PLAN:   1. Discussed with patient potential management options further conditions including medication management as well as nonpharmacologic strategies. Patient on board with current plan of care. 2.  At present we will plan some nonpharmacologic options for ADHD. Patient was given medication options as well as resources such as [email or web address] for reference. Patient will consult with his cardiologist for clearance regarding stimulant medications. 3. We will plan to maintain the patient on his current medication of venlafaxine 150mg daily. Depending on his progress with stimulant medications we may be able to wean him down off this medication.       Medication Monitoring:    - PDMP reviewed: None on file      Follow-up: RTC in 4 weeks    Safety: Pt was counseled on the potential for increased suicidal ideations and advised on potential options for dealing with these including hotlines, calling the office, or going to the nearest

## 2023-02-03 ENCOUNTER — OFFICE VISIT (OUTPATIENT)
Dept: PSYCHIATRY | Age: 38
End: 2023-02-03
Payer: COMMERCIAL

## 2023-02-03 VITALS
BODY MASS INDEX: 38.02 KG/M2 | SYSTOLIC BLOOD PRESSURE: 149 MMHG | HEART RATE: 102 BPM | DIASTOLIC BLOOD PRESSURE: 85 MMHG | WEIGHT: 304.2 LBS

## 2023-02-03 DIAGNOSIS — F90.0 ADHD, PREDOMINANTLY INATTENTIVE TYPE: Primary | ICD-10-CM

## 2023-02-03 PROCEDURE — G8417 CALC BMI ABV UP PARAM F/U: HCPCS | Performed by: STUDENT IN AN ORGANIZED HEALTH CARE EDUCATION/TRAINING PROGRAM

## 2023-02-03 PROCEDURE — 3074F SYST BP LT 130 MM HG: CPT | Performed by: STUDENT IN AN ORGANIZED HEALTH CARE EDUCATION/TRAINING PROGRAM

## 2023-02-03 PROCEDURE — 3078F DIAST BP <80 MM HG: CPT | Performed by: STUDENT IN AN ORGANIZED HEALTH CARE EDUCATION/TRAINING PROGRAM

## 2023-02-03 PROCEDURE — G8427 DOCREV CUR MEDS BY ELIG CLIN: HCPCS | Performed by: STUDENT IN AN ORGANIZED HEALTH CARE EDUCATION/TRAINING PROGRAM

## 2023-02-03 PROCEDURE — G8484 FLU IMMUNIZE NO ADMIN: HCPCS | Performed by: STUDENT IN AN ORGANIZED HEALTH CARE EDUCATION/TRAINING PROGRAM

## 2023-02-03 PROCEDURE — 1036F TOBACCO NON-USER: CPT | Performed by: STUDENT IN AN ORGANIZED HEALTH CARE EDUCATION/TRAINING PROGRAM

## 2023-02-03 PROCEDURE — 99204 OFFICE O/P NEW MOD 45 MIN: CPT | Performed by: STUDENT IN AN ORGANIZED HEALTH CARE EDUCATION/TRAINING PROGRAM

## 2023-02-03 ASSESSMENT — ANXIETY QUESTIONNAIRES
1. FEELING NERVOUS, ANXIOUS, OR ON EDGE: 2
5. BEING SO RESTLESS THAT IT IS HARD TO SIT STILL: 3
7. FEELING AFRAID AS IF SOMETHING AWFUL MIGHT HAPPEN: 0
4. TROUBLE RELAXING: 3
2. NOT BEING ABLE TO STOP OR CONTROL WORRYING: 0
IF YOU CHECKED OFF ANY PROBLEMS ON THIS QUESTIONNAIRE, HOW DIFFICULT HAVE THESE PROBLEMS MADE IT FOR YOU TO DO YOUR WORK, TAKE CARE OF THINGS AT HOME, OR GET ALONG WITH OTHER PEOPLE: VERY DIFFICULT
GAD7 TOTAL SCORE: 11
3. WORRYING TOO MUCH ABOUT DIFFERENT THINGS: 2
6. BECOMING EASILY ANNOYED OR IRRITABLE: 1

## 2023-02-03 ASSESSMENT — PATIENT HEALTH QUESTIONNAIRE - PHQ9
2. FEELING DOWN, DEPRESSED OR HOPELESS: 0
9. THOUGHTS THAT YOU WOULD BE BETTER OFF DEAD, OR OF HURTING YOURSELF: 0
6. FEELING BAD ABOUT YOURSELF - OR THAT YOU ARE A FAILURE OR HAVE LET YOURSELF OR YOUR FAMILY DOWN: 0
5. POOR APPETITE OR OVEREATING: 2
1. LITTLE INTEREST OR PLEASURE IN DOING THINGS: 0
3. TROUBLE FALLING OR STAYING ASLEEP: 3
SUM OF ALL RESPONSES TO PHQ9 QUESTIONS 1 & 2: 0
SUM OF ALL RESPONSES TO PHQ QUESTIONS 1-9: 14
7. TROUBLE CONCENTRATING ON THINGS, SUCH AS READING THE NEWSPAPER OR WATCHING TELEVISION: 3
4. FEELING TIRED OR HAVING LITTLE ENERGY: 3
SUM OF ALL RESPONSES TO PHQ QUESTIONS 1-9: 14
10. IF YOU CHECKED OFF ANY PROBLEMS, HOW DIFFICULT HAVE THESE PROBLEMS MADE IT FOR YOU TO DO YOUR WORK, TAKE CARE OF THINGS AT HOME, OR GET ALONG WITH OTHER PEOPLE: 2
SUM OF ALL RESPONSES TO PHQ QUESTIONS 1-9: 14
SUM OF ALL RESPONSES TO PHQ QUESTIONS 1-9: 14
8. MOVING OR SPEAKING SO SLOWLY THAT OTHER PEOPLE COULD HAVE NOTICED. OR THE OPPOSITE, BEING SO FIGETY OR RESTLESS THAT YOU HAVE BEEN MOVING AROUND A LOT MORE THAN USUAL: 3

## 2023-03-12 DIAGNOSIS — I25.10 CAD, MULTIPLE VESSEL: ICD-10-CM

## 2023-03-13 RX ORDER — ASPIRIN 81 MG/1
TABLET ORAL
Qty: 30 TABLET | Refills: 10 | OUTPATIENT
Start: 2023-03-13

## 2023-03-13 NOTE — TELEPHONE ENCOUNTER
Medication:   Requested Prescriptions     Pending Prescriptions Disp Refills    aspirin (ASPIRIN LOW DOSE) 81 MG EC tablet [Pharmacy Med Name: ASPIRIN 81MG EC LOW DOSE TABLETS] 30 tablet 10     Sig: TAKE 1 TABLET BY MOUTH EVERY DAY        Last Filled:  03/03/22    Patient Phone Number: [phone] (home)     Last appt: 8/22/2022   Next appt: Visit date not found    Last OARRS: No flowsheet data found. congestion

## 2023-03-19 DIAGNOSIS — I25.10 CAD, MULTIPLE VESSEL: ICD-10-CM

## 2023-03-19 DIAGNOSIS — I10 ESSENTIAL HYPERTENSION: ICD-10-CM

## 2023-03-20 RX ORDER — CARVEDILOL 6.25 MG/1
TABLET ORAL
Qty: 60 TABLET | Refills: 0 | Status: SHIPPED | OUTPATIENT
Start: 2023-03-20

## 2023-03-20 NOTE — TELEPHONE ENCOUNTER
Medication:   Requested Prescriptions     Pending Prescriptions Disp Refills    carvedilol (COREG) 6.25 MG tablet [Pharmacy Med Name: CARVEDILOL 6.25MG TABLETS] 60 tablet 11     Sig: TAKE 1 AND 1/2 TABLETS BY MOUTH TWICE DAILY WITH FOOD        Last Filled:  03/03/23    Patient Phone Number: [phone] (home)     Last appt: 8/22/2022 RTC Return in about 3 months (around 11/22/2022). Next appt: Visit date not found    Last OARRS: No flowsheet data found.

## 2023-04-04 ENCOUNTER — OFFICE VISIT (OUTPATIENT)
Dept: PSYCHIATRY | Age: 38
End: 2023-04-04
Payer: COMMERCIAL

## 2023-04-04 VITALS
BODY MASS INDEX: 37.87 KG/M2 | WEIGHT: 303 LBS | HEART RATE: 116 BPM | DIASTOLIC BLOOD PRESSURE: 83 MMHG | SYSTOLIC BLOOD PRESSURE: 113 MMHG

## 2023-04-04 DIAGNOSIS — F90.0 ADHD, PREDOMINANTLY INATTENTIVE TYPE: Primary | ICD-10-CM

## 2023-04-04 PROCEDURE — 99214 OFFICE O/P EST MOD 30 MIN: CPT | Performed by: STUDENT IN AN ORGANIZED HEALTH CARE EDUCATION/TRAINING PROGRAM

## 2023-04-04 PROCEDURE — 1036F TOBACCO NON-USER: CPT | Performed by: STUDENT IN AN ORGANIZED HEALTH CARE EDUCATION/TRAINING PROGRAM

## 2023-04-04 PROCEDURE — G8427 DOCREV CUR MEDS BY ELIG CLIN: HCPCS | Performed by: STUDENT IN AN ORGANIZED HEALTH CARE EDUCATION/TRAINING PROGRAM

## 2023-04-04 PROCEDURE — 3074F SYST BP LT 130 MM HG: CPT | Performed by: STUDENT IN AN ORGANIZED HEALTH CARE EDUCATION/TRAINING PROGRAM

## 2023-04-04 PROCEDURE — 3078F DIAST BP <80 MM HG: CPT | Performed by: STUDENT IN AN ORGANIZED HEALTH CARE EDUCATION/TRAINING PROGRAM

## 2023-04-04 PROCEDURE — G8417 CALC BMI ABV UP PARAM F/U: HCPCS | Performed by: STUDENT IN AN ORGANIZED HEALTH CARE EDUCATION/TRAINING PROGRAM

## 2023-04-04 ASSESSMENT — ANXIETY QUESTIONNAIRES
2. NOT BEING ABLE TO STOP OR CONTROL WORRYING: 0
5. BEING SO RESTLESS THAT IT IS HARD TO SIT STILL: 1
7. FEELING AFRAID AS IF SOMETHING AWFUL MIGHT HAPPEN: 0
4. TROUBLE RELAXING: 1
GAD7 TOTAL SCORE: 2
6. BECOMING EASILY ANNOYED OR IRRITABLE: 0
IF YOU CHECKED OFF ANY PROBLEMS ON THIS QUESTIONNAIRE, HOW DIFFICULT HAVE THESE PROBLEMS MADE IT FOR YOU TO DO YOUR WORK, TAKE CARE OF THINGS AT HOME, OR GET ALONG WITH OTHER PEOPLE: NOT DIFFICULT AT ALL
3. WORRYING TOO MUCH ABOUT DIFFERENT THINGS: 0
1. FEELING NERVOUS, ANXIOUS, OR ON EDGE: 0

## 2023-04-04 ASSESSMENT — PATIENT HEALTH QUESTIONNAIRE - PHQ9
9. THOUGHTS THAT YOU WOULD BE BETTER OFF DEAD, OR OF HURTING YOURSELF: 0
5. POOR APPETITE OR OVEREATING: 1
10. IF YOU CHECKED OFF ANY PROBLEMS, HOW DIFFICULT HAVE THESE PROBLEMS MADE IT FOR YOU TO DO YOUR WORK, TAKE CARE OF THINGS AT HOME, OR GET ALONG WITH OTHER PEOPLE: 1
SUM OF ALL RESPONSES TO PHQ9 QUESTIONS 1 & 2: 1
2. FEELING DOWN, DEPRESSED OR HOPELESS: 1
4. FEELING TIRED OR HAVING LITTLE ENERGY: 2
SUM OF ALL RESPONSES TO PHQ QUESTIONS 1-9: 9
7. TROUBLE CONCENTRATING ON THINGS, SUCH AS READING THE NEWSPAPER OR WATCHING TELEVISION: 1
8. MOVING OR SPEAKING SO SLOWLY THAT OTHER PEOPLE COULD HAVE NOTICED. OR THE OPPOSITE, BEING SO FIGETY OR RESTLESS THAT YOU HAVE BEEN MOVING AROUND A LOT MORE THAN USUAL: 1
SUM OF ALL RESPONSES TO PHQ QUESTIONS 1-9: 9
6. FEELING BAD ABOUT YOURSELF - OR THAT YOU ARE A FAILURE OR HAVE LET YOURSELF OR YOUR FAMILY DOWN: 0
3. TROUBLE FALLING OR STAYING ASLEEP: 3
1. LITTLE INTEREST OR PLEASURE IN DOING THINGS: 0

## 2023-04-04 ASSESSMENT — ENCOUNTER SYMPTOMS
RESPIRATORY NEGATIVE: 1
GASTROINTESTINAL NEGATIVE: 1
ALLERGIC/IMMUNOLOGIC NEGATIVE: 1
EYES NEGATIVE: 1

## 2023-04-04 NOTE — PROGRESS NOTES
prior psychiatry  NSSI: Denied  Suicide Attempts: Denied    O:  Wt Readings from Last 3 Encounters:   04/04/23 (!) 303 lb (137.4 kg)   02/03/23 (!) 304 lb 3.2 oz (138 kg)   08/22/22 299 lb (135.6 kg)       Vitals:    04/04/23 1553   BP: 113/83   Pulse: (!) 116   Weight: (!) 303 lb (137.4 kg)       Mental Status Exam:   Appearance:    Appropriately dressed  Motor: Some fidgeting present  Eye Contact: Generally good  Speech:    Appropriate rate and rhythm  Language:   Appropriate diction  Mood/Affect:  \"Better\"/ Phillips range of affect  Thought Process:   Linear, logical  Thought Content:    Topic-appropriate, no SI/HI  Hallucinations:   Denied, not seen to be responding to IS  Associations:   Intact  Attention/Concentration:   Intact  Orientation:    Alert and oriented x4  Memory:   Intact  Fund of Knowledge:    Appropriate for age and education  Insight/Judgement:   Intact/intact      PHQ-9 Questionaire 4/4/2023 2/3/2023   Little interest or pleasure in doing things 0 0   Feeling down, depressed, or hopeless 1 0   Trouble falling or staying asleep, or sleeping too much 3 3   Feeling tired or having little energy 2 3   Poor appetite or overeating 1 2   Feeling bad about yourself - or that you are a failure or have let yourself or your family down 0 0   Trouble concentrating on things, such as reading the newspaper or watching television 1 3   Moving or speaking so slowly that other people could have noticed. Or the opposite - being so fidgety or restless that you have been moving around a lot more than usual 1 3   Thoughts that you would be better off dead, or of hurting yourself in some way 0 0   PHQ-9 Total Score 9 14   If you checked off any problems, how difficult have these problems made it for you to do your work, take care of things at home, or get along with other people?  1 2     DRAKE-7 SCREENING 4/4/2023 2/3/2023   Feeling nervous, anxious, or on edge Not at all More than half the days   Not being able to stop

## 2023-04-05 ASSESSMENT — ENCOUNTER SYMPTOMS
RESPIRATORY NEGATIVE: 1
GASTROINTESTINAL NEGATIVE: 1
EYES NEGATIVE: 1
ALLERGIC/IMMUNOLOGIC NEGATIVE: 1

## 2023-04-06 DIAGNOSIS — I10 ESSENTIAL HYPERTENSION: ICD-10-CM

## 2023-04-06 DIAGNOSIS — I25.10 CAD, MULTIPLE VESSEL: ICD-10-CM

## 2023-04-06 RX ORDER — CARVEDILOL 6.25 MG/1
TABLET ORAL
Qty: 60 TABLET | Refills: 0 | Status: SHIPPED | OUTPATIENT
Start: 2023-04-06

## 2023-04-06 NOTE — TELEPHONE ENCOUNTER
Medication:   Requested Prescriptions     Pending Prescriptions Disp Refills    carvedilol (COREG) 6.25 MG tablet [Pharmacy Med Name: CARVEDILOL 6.25MG TABLETS] 60 tablet 0     Sig: TAKE 1 AND 1/2 TABLETS BY MOUTH TWICE DAILY WITH FOOD        Last Filled:  3/20/23    Patient Phone Number: [phone] (home)     Last appt: 8/22/2022   Next appt: Visit date not found  RTC Return in about 3 months (around 11/22/2022). Last OARRS: No flowsheet data found.

## 2023-04-23 DIAGNOSIS — I10 ESSENTIAL HYPERTENSION: ICD-10-CM

## 2023-04-23 DIAGNOSIS — I25.10 CAD, MULTIPLE VESSEL: ICD-10-CM

## 2023-04-24 RX ORDER — CARVEDILOL 6.25 MG/1
TABLET ORAL
Qty: 60 TABLET | Refills: 0 | OUTPATIENT
Start: 2023-04-24

## 2023-04-24 NOTE — TELEPHONE ENCOUNTER
Medication:   Requested Prescriptions     Pending Prescriptions Disp Refills    carvedilol (COREG) 6.25 MG tablet [Pharmacy Med Name: CARVEDILOL 6.25MG TABLETS] 60 tablet 0     Sig: TAKE 1 AND 1/2 TABLETS BY MOUTH TWICE DAILY WITH FOOD        Last Filled:  04/26/23    Patient Phone Number: [phone] (home)     Last appt: 8/22/2022   Next appt: Visit date not found    Last OARRS: No flowsheet data found.

## 2023-05-11 DIAGNOSIS — I10 ESSENTIAL HYPERTENSION: ICD-10-CM

## 2023-05-11 DIAGNOSIS — E78.00 HYPERCHOLESTEROLEMIA: ICD-10-CM

## 2023-05-11 RX ORDER — LOSARTAN POTASSIUM 25 MG/1
25 TABLET ORAL DAILY
Qty: 90 TABLET | Refills: 3 | Status: SHIPPED | OUTPATIENT
Start: 2023-05-11

## 2023-05-11 RX ORDER — EZETIMIBE 10 MG/1
10 TABLET ORAL DAILY
Qty: 90 TABLET | Refills: 3 | Status: SHIPPED | OUTPATIENT
Start: 2023-05-11

## 2023-05-11 NOTE — TELEPHONE ENCOUNTER
Medication:   Requested Prescriptions     Pending Prescriptions Disp Refills    losartan (COZAAR) 25 MG tablet [Pharmacy Med Name: LOSARTAN 25MG TABLETS] 90 tablet 3     Sig: TAKE 1 TABLET BY MOUTH DAILY    ezetimibe (Taylor Sheller) 10 MG tablet [Pharmacy Med Name: EZETIMIBE 10MG TABLETS] 90 tablet 3     Sig: TAKE 1 TABLET BY MOUTH DAILY        Last Filled:  Last refill: 2/13/2023    Patient Phone Number: [phone] (home)     Last appt: 8/22/2022   Next appt: Visit date not found  RTC Return in about 3 months (around 11/22/2022). Last OARRS: No flowsheet data found.

## 2024-02-20 ENCOUNTER — HOSPITAL ENCOUNTER (EMERGENCY)
Age: 39
Discharge: HOME | End: 2024-02-20
Payer: COMMERCIAL

## 2024-02-20 ENCOUNTER — APPOINTMENT (OUTPATIENT)
Dept: GENERAL RADIOLOGY | Age: 39
End: 2024-02-20
Payer: COMMERCIAL

## 2024-02-20 VITALS
BODY MASS INDEX: 39.16 KG/M2 | HEART RATE: 90 BPM | SYSTOLIC BLOOD PRESSURE: 145 MMHG | TEMPERATURE: 98.4 F | RESPIRATION RATE: 18 BRPM | OXYGEN SATURATION: 97 % | DIASTOLIC BLOOD PRESSURE: 99 MMHG | WEIGHT: 313.27 LBS

## 2024-02-20 DIAGNOSIS — J40 BRONCHITIS: Primary | ICD-10-CM

## 2024-02-20 DIAGNOSIS — Z86.79 HISTORY OF CORONARY ARTERY DISEASE: ICD-10-CM

## 2024-02-20 LAB
ANION GAP SERPL CALCULATED.3IONS-SCNC: 12 MMOL/L (ref 3–16)
BASOPHILS # BLD: 0.1 K/UL (ref 0–0.2)
BASOPHILS NFR BLD: 1 %
BUN SERPL-MCNC: 11 MG/DL (ref 7–20)
CALCIUM SERPL-MCNC: 9.3 MG/DL (ref 8.3–10.6)
CHLORIDE SERPL-SCNC: 102 MMOL/L (ref 99–110)
CO2 SERPL-SCNC: 25 MMOL/L (ref 21–32)
CREAT SERPL-MCNC: 0.7 MG/DL (ref 0.9–1.3)
D DIMER: <0.27 UG/ML FEU (ref 0–0.6)
DEPRECATED RDW RBC AUTO: 14.1 % (ref 12.4–15.4)
EKG ATRIAL RATE: 108 BPM
EKG DIAGNOSIS: NORMAL
EKG P AXIS: 55 DEGREES
EKG P-R INTERVAL: 148 MS
EKG Q-T INTERVAL: 364 MS
EKG QRS DURATION: 82 MS
EKG QTC CALCULATION (BAZETT): 487 MS
EKG R AXIS: -20 DEGREES
EKG T AXIS: 59 DEGREES
EKG VENTRICULAR RATE: 108 BPM
EOSINOPHIL # BLD: 0.6 K/UL (ref 0–0.6)
EOSINOPHIL NFR BLD: 8.1 %
GFR SERPLBLD CREATININE-BSD FMLA CKD-EPI: >60 ML/MIN/{1.73_M2}
GLUCOSE SERPL-MCNC: 91 MG/DL (ref 70–99)
HCT VFR BLD AUTO: 47.3 % (ref 40.5–52.5)
HGB BLD-MCNC: 16.4 G/DL (ref 13.5–17.5)
LYMPHOCYTES # BLD: 3.1 K/UL (ref 1–5.1)
LYMPHOCYTES NFR BLD: 40.6 %
MCH RBC QN AUTO: 29.5 PG (ref 26–34)
MCHC RBC AUTO-ENTMCNC: 34.7 G/DL (ref 31–36)
MCV RBC AUTO: 85.1 FL (ref 80–100)
MONOCYTES # BLD: 0.7 K/UL (ref 0–1.3)
MONOCYTES NFR BLD: 9.4 %
NEUTROPHILS # BLD: 3.2 K/UL (ref 1.7–7.7)
NEUTROPHILS NFR BLD: 40.9 %
PLATELET # BLD AUTO: 239 K/UL (ref 135–450)
PMV BLD AUTO: 8.5 FL (ref 5–10.5)
POTASSIUM SERPL-SCNC: 4.2 MMOL/L (ref 3.5–5.1)
RBC # BLD AUTO: 5.56 M/UL (ref 4.2–5.9)
SODIUM SERPL-SCNC: 139 MMOL/L (ref 136–145)
TROPONIN, HIGH SENSITIVITY: 12 NG/L (ref 0–22)
WBC # BLD AUTO: 7.7 K/UL (ref 4–11)

## 2024-02-20 PROCEDURE — 71046 X-RAY EXAM CHEST 2 VIEWS: CPT

## 2024-02-20 PROCEDURE — 85379 FIBRIN DEGRADATION QUANT: CPT

## 2024-02-20 PROCEDURE — 93010 ELECTROCARDIOGRAM REPORT: CPT | Performed by: INTERNAL MEDICINE

## 2024-02-20 PROCEDURE — 93005 ELECTROCARDIOGRAM TRACING: CPT | Performed by: PHYSICIAN ASSISTANT

## 2024-02-20 PROCEDURE — 80048 BASIC METABOLIC PNL TOTAL CA: CPT

## 2024-02-20 PROCEDURE — 85025 COMPLETE CBC W/AUTO DIFF WBC: CPT

## 2024-02-20 PROCEDURE — 99285 EMERGENCY DEPT VISIT HI MDM: CPT

## 2024-02-20 PROCEDURE — 84484 ASSAY OF TROPONIN QUANT: CPT

## 2024-02-20 RX ORDER — METHYLPREDNISOLONE 4 MG/1
4 TABLET ORAL SEE ADMIN INSTRUCTIONS
Qty: 1 KIT | Refills: 0 | Status: SHIPPED | OUTPATIENT
Start: 2024-02-20 | End: 2024-02-26

## 2024-02-20 RX ORDER — BENZONATATE 200 MG/1
200 CAPSULE ORAL 3 TIMES DAILY PRN
Qty: 30 CAPSULE | Refills: 0 | Status: SHIPPED | OUTPATIENT
Start: 2024-02-20 | End: 2024-03-01

## 2024-02-20 RX ORDER — ALBUTEROL SULFATE 90 UG/1
2 AEROSOL, METERED RESPIRATORY (INHALATION) EVERY 6 HOURS PRN
Qty: 18 G | Refills: 0 | Status: SHIPPED | OUTPATIENT
Start: 2024-02-20

## 2024-02-20 NOTE — ED PROVIDER NOTES
[facility] EMERGENCY DEPARTMENT  EMERGENCY DEPARTMENT ENCOUNTER      Pt Name: Quinton Zimmerman  MRN: [id number]  Birthdate 1985  Date of evaluation: 2/20/2024  Provider: ANUPAMA Garcia  PCP: Jimmy Clinton DO  Note Started: 1:27 PM EST     The ED Attending Physician was available for consultation but did not see or evaluate this patient.    CHIEF COMPLAINT       Chief Complaint   Patient presents with    Cough     Pt reports productive cough since beginning of February. Pt reports R sided rib pain now d/t coughing so much. Went to urgent care initially and told viral infection but not getting better.        HISTORY OF PRESENT ILLNESS   (Location, Timing/Onset, Context/Setting, Quality, Duration, Modifying Factors, Severity, Associated Signs and Symptoms)  Note limiting factors.     Quinton Zimmerman is a 39 y.o. male who presents with complaint of a cough that is been lasting for a few weeks now.  He says there was some associated cold symptoms at first but those went away.  He went to an urgent care, was told he had a viral infection.  All of the symptoms got better but the cough persists, it is often productive of some whitish or clear sputum, and he occasionally feels short of breath with the cough.  His chest is sore but not exactly painful.  Has a history of CAD and multiple stents.  No known lung disease.  Does not smoke cigarettes.  Says his symptoms of coughing and shortness of breath are worse when he is lying flat.  Tried using his mother's albuterol inhaler and it helped a little.    Nursing Notes were all reviewed and agreed with or any disagreements were addressed in the HPI.    REVIEW OF SYSTEMS    (2-9 systems for level 4, 10 or more for level 5)     Positives and pertinent negatives as per HPI.     PAST MEDICAL HISTORY     Past Medical History:   Diagnosis Date    Allergic rhinitis     Asthma     CAD (coronary artery disease) 10/11/2018    DEDE x 4 (DAPT x 1 year)

## (undated) DEVICE — GLOVE SURG SZ 65 L12IN FNGR THK94MIL STD WHT LTX FREE

## (undated) DEVICE — GLOVE SURG SZ 75 L12IN FNGR THK94MIL STD WHT LTX FREE

## (undated) DEVICE — INTENDED USE FOR SURGICAL MARKING ON INTACT SKIN, ALSO PROVIDES A PERMANENT METHOD OF IDENTIFYING OBJECTS IN THE OPERATING ROOM: Brand: WRITESITE® PLUS MINI PREP RESISTANT MARKER

## (undated) DEVICE — 10FR FRAZIER SUCTION HANDLE: Brand: CARDINAL HEALTH

## (undated) DEVICE — HEAD AND NECK PACK: Brand: CONVERTORS

## (undated) DEVICE — SUTURE PLN GUT SZ 5-0 L18IN ABSRB YELLOWISH TAN L13MM PC-1 1915G

## (undated) DEVICE — GLOVE,SURG,SENSICARE,ALOE,LF,PF,7: Brand: MEDLINE

## (undated) DEVICE — SOLUTION IV IRRIG 500ML 0.9% SODIUM CHL 2F7123

## (undated) DEVICE — COTTON BALLS 5/PK: Brand: CARDINAL HEALTH

## (undated) DEVICE — SUTURE NONABSORBABLE MONOFILAMENT 5-0 M1 P-3 18 IN PROLENE 8698H

## (undated) DEVICE — ELECTRODE ELECSURG NDL 2.8 INX7.2 CM COAT INSUL EDGE

## (undated) DEVICE — DRESSING,GAUZE,XEROFORM,CURAD,5"X9",ST: Brand: CURAD

## (undated) DEVICE — MEDI-VAC NON-CONDUCTIVE SUCTION TUBING: Brand: CARDINAL HEALTH

## (undated) DEVICE — NEEDLE HYPO 30GA L0.5IN BGE POLYPR HUB S STL REG BVL STR

## (undated) DEVICE — GLOVE SURG SZ 85 L12IN FNGR THK94MIL STD WHT LTX FREE

## (undated) DEVICE — MINOR SET UP PK

## (undated) DEVICE — ELECTRODE PT RET AD L9FT HI MOIST COND ADH HYDRGEL CORDED

## (undated) DEVICE — PENCIL ES L3M BTTN SWCH S STL HEX LOK BLDE ELECTRD HOLSTER

## (undated) DEVICE — GAUZE,SPONGE,4"X4",16PLY,STRL,LF,10/TRAY: Brand: MEDLINE

## (undated) DEVICE — SYRINGE MED 10ML LUERLOCK TIP W/O SFTY DISP